# Patient Record
Sex: MALE | Race: WHITE | NOT HISPANIC OR LATINO | Employment: FULL TIME | URBAN - METROPOLITAN AREA
[De-identification: names, ages, dates, MRNs, and addresses within clinical notes are randomized per-mention and may not be internally consistent; named-entity substitution may affect disease eponyms.]

---

## 2018-04-19 ENCOUNTER — OFFICE VISIT (OUTPATIENT)
Dept: CARDIOLOGY | Facility: CLINIC | Age: 53
End: 2018-04-19

## 2018-04-19 VITALS
WEIGHT: 265 LBS | DIASTOLIC BLOOD PRESSURE: 74 MMHG | BODY MASS INDEX: 37.94 KG/M2 | SYSTOLIC BLOOD PRESSURE: 112 MMHG | HEART RATE: 82 BPM | HEIGHT: 70 IN

## 2018-04-19 DIAGNOSIS — I49.3 PVC'S (PREMATURE VENTRICULAR CONTRACTIONS): ICD-10-CM

## 2018-04-19 DIAGNOSIS — R00.2 HEART PALPITATIONS: Primary | ICD-10-CM

## 2018-04-19 PROBLEM — I49.1 PREMATURE ATRIAL COMPLEXES: Status: ACTIVE | Noted: 2018-04-19

## 2018-04-19 PROCEDURE — 99214 OFFICE O/P EST MOD 30 MIN: CPT | Performed by: INTERNAL MEDICINE

## 2018-04-19 PROCEDURE — 93000 ELECTROCARDIOGRAM COMPLETE: CPT | Performed by: INTERNAL MEDICINE

## 2018-04-19 RX ORDER — CHLORTHALIDONE 25 MG/1
TABLET ORAL DAILY
COMMUNITY
Start: 2013-10-30

## 2018-04-19 RX ORDER — INSULIN ASPART 100 [IU]/ML
INJECTION, SOLUTION INTRAVENOUS; SUBCUTANEOUS
COMMUNITY
Start: 2018-04-17

## 2018-04-19 RX ORDER — METOPROLOL SUCCINATE 100 MG/1
TABLET, EXTENDED RELEASE ORAL DAILY
COMMUNITY
Start: 2014-08-11 | End: 2022-10-17 | Stop reason: SDUPTHER

## 2018-04-19 RX ORDER — ESCITALOPRAM OXALATE 20 MG/1
20 TABLET ORAL DAILY
COMMUNITY
Start: 2014-04-21

## 2018-04-19 RX ORDER — MELOXICAM 15 MG/1
TABLET ORAL DAILY
COMMUNITY
Start: 2014-07-07

## 2018-04-19 RX ORDER — ROPINIROLE 0.5 MG/1
0.5 TABLET, FILM COATED ORAL DAILY
COMMUNITY
Start: 2018-04-17 | End: 2020-11-12 | Stop reason: ALTCHOICE

## 2018-04-19 RX ORDER — MONTELUKAST SODIUM 10 MG/1
10 TABLET ORAL NIGHTLY
COMMUNITY

## 2018-04-19 RX ORDER — SITAGLIPTIN AND METFORMIN HYDROCHLORIDE 1000; 50 MG/1; MG/1
TABLET, FILM COATED ORAL
Refills: 2 | COMMUNITY
Start: 2018-04-02 | End: 2018-11-01 | Stop reason: ALTCHOICE

## 2018-04-19 RX ORDER — ASPIRIN 81 MG/1
81 TABLET, CHEWABLE ORAL DAILY
COMMUNITY
End: 2019-11-07

## 2018-04-19 RX ORDER — FENOFIBRATE 160 MG/1
160 TABLET ORAL DAILY
COMMUNITY
Start: 2013-10-29 | End: 2019-07-16 | Stop reason: SDUPTHER

## 2018-04-19 RX ORDER — INSULIN DEGLUDEC 200 U/ML
INJECTION, SOLUTION SUBCUTANEOUS
Refills: 3 | COMMUNITY
Start: 2018-04-01 | End: 2021-11-24

## 2018-04-19 NOTE — PROGRESS NOTES
Ridge Dowd   1965  Date of Office Visit: 04/19/2018  Encounter Provider: Alek Do MD  Place of Service: [facility] CARDIOLOGY      CHIEF COMPLAINT:  Palpitations  PACs  PVCs    HISTORY OF PRESENT ILLNESS:Dr. Gibson,    I had the pleasure of seeing your patient in consultation today. As you well know, the patient is a very pleasant 52-year-old gentleman with a medical history of obesity, sleep apnea with CPAP noncompliance, diabetes mellitus, and hyperlipidemia who presents to me secondary to palpitations. He has previously been seen by Dr. Chawla in 2014. He had a Holter monitor placed at that time and previously had had a stress test with no evidence of ischemia or infarction. His Holter monitor showed occasional PVCs accounting for 1.3% of the overall beats. He was started back then on Toprol-XL therapy and is tolerating well.     He states that over the past couple of weeks he has noticed palpitations again. These are moderate in intensity and typically come on more towards the evening as his Toprol-XL is starting to come close to being due. He denies any chest pain. He has mild dyspnea on exertion. No orthopnea or PND. He denies any syncope or sustained tachycardia.        Review of Systems   Constitution: Negative for fever, weakness and malaise/fatigue.   HENT: Negative for nosebleeds and sore throat.    Eyes: Negative for blurred vision and double vision.   Cardiovascular: Negative for chest pain, claudication, palpitations and syncope.   Respiratory: Negative for cough, shortness of breath and snoring.    Endocrine: Negative for cold intolerance, heat intolerance and polydipsia.   Skin: Negative for itching, poor wound healing and rash.   Musculoskeletal: Negative for joint pain, joint swelling, muscle weakness and myalgias.   Gastrointestinal: Negative for abdominal pain, melena, nausea and vomiting.   Neurological: Negative for light-headedness,  "loss of balance, seizures and vertigo.   Psychiatric/Behavioral: Negative for altered mental status and depression.       Past Medical History:   Diagnosis Date   • Diabetes mellitus    • History of hyperlipidemia     with significant hypertriglyceridemia   • History of obesity    • History of obstructive sleep apnea     Not on CPAP   • Personal history of asthma    • Personal history of renal calculi    • PVC (premature ventricular contraction)     history of PVC       The following portions of the patient's history were reviewed and updated as appropriate: Social history , Family history and Surgical history     No current outpatient prescriptions on file prior to visit.     No current facility-administered medications on file prior to visit.        Allergies   Allergen Reactions   • Oxycodone Itching       Vitals:    04/19/18 1259   BP: 112/74   Pulse: 82   Weight: 120 kg (265 lb)   Height: 177.8 cm (70\")     Physical Exam   Constitutional: He is oriented to person, place, and time. He appears well-developed and well-nourished.   Obese     HENT:   Head: Normocephalic and atraumatic.   Eyes: Conjunctivae and EOM are normal. No scleral icterus.   Neck: Normal range of motion. Neck supple. Normal carotid pulses, no hepatojugular reflux and no JVD present. Carotid bruit is not present. No tracheal deviation present. No thyromegaly present.   Cardiovascular: Normal rate and regular rhythm.  Exam reveals no gallop and no friction rub.    No murmur heard.  Pulses:       Carotid pulses are 2+ on the right side, and 2+ on the left side.       Radial pulses are 2+ on the right side, and 2+ on the left side.        Femoral pulses are 2+ on the right side, and 2+ on the left side.       Dorsalis pedis pulses are 2+ on the right side, and 2+ on the left side.        Posterior tibial pulses are 2+ on the right side, and 2+ on the left side.   Pulmonary/Chest: Breath sounds normal. No respiratory distress. He has no decreased " breath sounds. He has no wheezes. He has no rhonchi. He has no rales. He exhibits no tenderness.   Abdominal: Soft. Bowel sounds are normal. He exhibits no distension. There is no tenderness. There is no rebound.   Musculoskeletal: He exhibits no edema or deformity.   Neurological: He is alert and oriented to person, place, and time. He has normal strength. No sensory deficit.   Skin: No rash noted. No erythema.   Psychiatric: He has a normal mood and affect. His behavior is normal.       No components found for: CBC  No results found for: CMP  No components found for: LIPID  No results found for: BMP      ECG 12 Lead  Date/Time: 4/19/2018 1:12 PM  Performed by: SEEMA BRAGG  Authorized by: SEEMA BRAGG   Comparison: compared with previous ECG from 9/16/2014  Similar to previous ECG  Rhythm: sinus rhythm  Ectopy: atrial premature contractions  Conduction: conduction normal  ST Segments: ST segments normal  Clinical impression: non-specific ECG             7/28/14  Exercise Cardiolite  9 minute exercise duration  No ischemia or infarction    DISCUSSION/SUMMARYA 52-year-old gentleman with obesity, CPAP noncompliance, diabetes mellitus, hyperlipidemia, and palpitations who presents to me for evaluation of palpitations. He has a previous history of premature ventricular complexes and is on high-dose Toprol-XL therapy. He is tolerating this well but does state that his palpitations have been more frequent as of late. These are low risk and he has not had any evidence of syncope or angina with them.    1. Palpitations: It is unclear to me whether he is just having more frequent PACs or whether he is having PVCs while on high-dose beta blockade therapy. I have encouraged weight loss and CPAP compliance. I think this would certainly address atrial ectopy and likely some degree of PVCs as well. If he has a significant PVC burden still on beta blockade, I would recommend an echocardiogram and an  antiarrhythmic. My preference would be to repeat a stress test and to put him on a 1C if that was the case.   2. Obesity: Weight loss and dietary modification.     I am also going to get an echo on him. I do not think he needs a stress test at this time. He has no angina.

## 2018-04-26 ENCOUNTER — HOSPITAL ENCOUNTER (OUTPATIENT)
Dept: CARDIOLOGY | Facility: HOSPITAL | Age: 53
Discharge: HOME | End: 2018-04-26
Attending: INTERNAL MEDICINE | Admitting: INTERNAL MEDICINE

## 2018-04-26 VITALS — HEIGHT: 70 IN | HEART RATE: 90 BPM | BODY MASS INDEX: 37.94 KG/M2 | WEIGHT: 265 LBS

## 2018-04-26 DIAGNOSIS — I49.3 PVC'S (PREMATURE VENTRICULAR CONTRACTIONS): ICD-10-CM

## 2018-04-26 DIAGNOSIS — R00.2 HEART PALPITATIONS: ICD-10-CM

## 2018-04-26 PROCEDURE — 25010000002 PERFLUTREN (DEFINITY) 8.476 MG IN SODIUM CHLORIDE 0.9 % 10 ML INJECTION: Performed by: INTERNAL MEDICINE

## 2018-04-26 PROCEDURE — 93306 TTE W/DOPPLER COMPLETE: CPT

## 2018-04-26 PROCEDURE — 93306 TTE W/DOPPLER COMPLETE: CPT | Performed by: INTERNAL MEDICINE

## 2018-04-26 RX ADMIN — PERFLUTREN 1.5 ML: 6.52 INJECTION, SUSPENSION INTRAVENOUS at 14:42

## 2018-04-27 LAB
ASCENDING AORTA: 3.2 CM
BH CV ECHO MEAS - ACS: 2.1 CM
BH CV ECHO MEAS - AO MAX PG (FULL): 0.97 MMHG
BH CV ECHO MEAS - AO MAX PG: 3.8 MMHG
BH CV ECHO MEAS - AO MEAN PG (FULL): 0.3 MMHG
BH CV ECHO MEAS - AO MEAN PG: 1.8 MMHG
BH CV ECHO MEAS - AO ROOT AREA (BSA CORRECTED): 1.3
BH CV ECHO MEAS - AO ROOT AREA: 7.1 CM^2
BH CV ECHO MEAS - AO ROOT DIAM: 3 CM
BH CV ECHO MEAS - AO V2 MAX: 97.7 CM/SEC
BH CV ECHO MEAS - AO V2 MEAN: 56.6 CM/SEC
BH CV ECHO MEAS - AO V2 VTI: 17.9 CM
BH CV ECHO MEAS - ASC AORTA: 3.2 CM
BH CV ECHO MEAS - AVA(I,A): 4.5 CM^2
BH CV ECHO MEAS - AVA(I,D): 4.5 CM^2
BH CV ECHO MEAS - AVA(V,A): 4.2 CM^2
BH CV ECHO MEAS - AVA(V,D): 4.2 CM^2
BH CV ECHO MEAS - BSA(HAYCOCK): 2.5 M^2
BH CV ECHO MEAS - BSA: 2.4 M^2
BH CV ECHO MEAS - BZI_BMI: 38 KILOGRAMS/M^2
BH CV ECHO MEAS - BZI_METRIC_HEIGHT: 177.8 CM
BH CV ECHO MEAS - BZI_METRIC_WEIGHT: 120.2 KG
BH CV ECHO MEAS - CONTRAST EF (2CH): 63.1 ML/M^2
BH CV ECHO MEAS - CONTRAST EF 4CH: 63.6 ML/M^2
BH CV ECHO MEAS - EDV(MOD-SP2): 84 ML
BH CV ECHO MEAS - EDV(MOD-SP4): 77 ML
BH CV ECHO MEAS - EDV(TEICH): 107.6 ML
BH CV ECHO MEAS - EF(CUBED): 78.4 %
BH CV ECHO MEAS - EF(MOD-BP): 64 %
BH CV ECHO MEAS - EF(MOD-SP2): 63.1 %
BH CV ECHO MEAS - EF(MOD-SP4): 63.6 %
BH CV ECHO MEAS - EF(TEICH): 70.5 %
BH CV ECHO MEAS - ESV(MOD-SP2): 31 ML
BH CV ECHO MEAS - ESV(MOD-SP4): 28 ML
BH CV ECHO MEAS - ESV(TEICH): 31.7 ML
BH CV ECHO MEAS - FS: 40 %
BH CV ECHO MEAS - IVS/LVPW: 1.1
BH CV ECHO MEAS - IVSD: 1.3 CM
BH CV ECHO MEAS - LAT PEAK E' VEL: 11 CM/SEC
BH CV ECHO MEAS - LV DIASTOLIC VOL/BSA (35-75): 32.7 ML/M^2
BH CV ECHO MEAS - LV MASS(C)D: 231.9 GRAMS
BH CV ECHO MEAS - LV MASS(C)DI: 98.6 GRAMS/M^2
BH CV ECHO MEAS - LV MAX PG: 2.8 MMHG
BH CV ECHO MEAS - LV MEAN PG: 1.5 MMHG
BH CV ECHO MEAS - LV SYSTOLIC VOL/BSA (12-30): 11.9 ML/M^2
BH CV ECHO MEAS - LV V1 MAX: 84.4 CM/SEC
BH CV ECHO MEAS - LV V1 MEAN: 53.6 CM/SEC
BH CV ECHO MEAS - LV V1 VTI: 16.4 CM
BH CV ECHO MEAS - LVIDD: 4.8 CM
BH CV ECHO MEAS - LVIDS: 2.9 CM
BH CV ECHO MEAS - LVLD AP2: 7.5 CM
BH CV ECHO MEAS - LVLD AP4: 8 CM
BH CV ECHO MEAS - LVLS AP2: 6.4 CM
BH CV ECHO MEAS - LVLS AP4: 6.5 CM
BH CV ECHO MEAS - LVOT AREA (M): 4.9 CM^2
BH CV ECHO MEAS - LVOT AREA: 4.9 CM^2
BH CV ECHO MEAS - LVOT DIAM: 2.5 CM
BH CV ECHO MEAS - LVPWD: 1.2 CM
BH CV ECHO MEAS - MED PEAK E' VEL: 8 CM/SEC
BH CV ECHO MEAS - MV A DUR: 0.11 SEC
BH CV ECHO MEAS - MV A MAX VEL: 66 CM/SEC
BH CV ECHO MEAS - MV DEC SLOPE: 380.7 CM/SEC^2
BH CV ECHO MEAS - MV DEC TIME: 0.2 SEC
BH CV ECHO MEAS - MV E MAX VEL: 75.7 CM/SEC
BH CV ECHO MEAS - MV E/A: 1.1
BH CV ECHO MEAS - MV MAX PG: 3.8 MMHG
BH CV ECHO MEAS - MV MEAN PG: 2 MMHG
BH CV ECHO MEAS - MV P1/2T MAX VEL: 76.6 CM/SEC
BH CV ECHO MEAS - MV P1/2T: 59 MSEC
BH CV ECHO MEAS - MV V2 MAX: 97.7 CM/SEC
BH CV ECHO MEAS - MV V2 MEAN: 66.1 CM/SEC
BH CV ECHO MEAS - MV V2 VTI: 18.1 CM
BH CV ECHO MEAS - MVA P1/2T LCG: 2.9 CM^2
BH CV ECHO MEAS - MVA(P1/2T): 3.7 CM^2
BH CV ECHO MEAS - MVA(VTI): 4.4 CM^2
BH CV ECHO MEAS - PA MAX PG (FULL): 2.7 MMHG
BH CV ECHO MEAS - PA MAX PG: 5.1 MMHG
BH CV ECHO MEAS - PA V2 MAX: 112.7 CM/SEC
BH CV ECHO MEAS - PULM A REVS DUR: 0.12 SEC
BH CV ECHO MEAS - PULM A REVS VEL: 33.3 CM/SEC
BH CV ECHO MEAS - PULM DIAS VEL: 49.5 CM/SEC
BH CV ECHO MEAS - PULM S/D: 1.4
BH CV ECHO MEAS - PULM SYS VEL: 67.1 CM/SEC
BH CV ECHO MEAS - PVA(V,A): 2.4 CM^2
BH CV ECHO MEAS - PVA(V,D): 2.4 CM^2
BH CV ECHO MEAS - QP/QS: 0.58
BH CV ECHO MEAS - RV MAX PG: 2.4 MMHG
BH CV ECHO MEAS - RV MEAN PG: 0.97 MMHG
BH CV ECHO MEAS - RV V1 MAX: 77.6 CM/SEC
BH CV ECHO MEAS - RV V1 MEAN: 42.9 CM/SEC
BH CV ECHO MEAS - RV V1 VTI: 13.3 CM
BH CV ECHO MEAS - RVOT AREA: 3.5 CM^2
BH CV ECHO MEAS - RVOT DIAM: 2.1 CM
BH CV ECHO MEAS - SI(AO): 54.3 ML/M^2
BH CV ECHO MEAS - SI(CUBED): 36.9 ML/M^2
BH CV ECHO MEAS - SI(LVOT): 34 ML/M^2
BH CV ECHO MEAS - SI(MOD-SP2): 22.5 ML/M^2
BH CV ECHO MEAS - SI(MOD-SP4): 20.8 ML/M^2
BH CV ECHO MEAS - SI(TEICH): 32.3 ML/M^2
BH CV ECHO MEAS - SUP REN AO DIAM: 1.6 CM
BH CV ECHO MEAS - SV(AO): 127.8 ML
BH CV ECHO MEAS - SV(CUBED): 86.8 ML
BH CV ECHO MEAS - SV(LVOT): 80 ML
BH CV ECHO MEAS - SV(MOD-SP2): 53 ML
BH CV ECHO MEAS - SV(MOD-SP4): 49 ML
BH CV ECHO MEAS - SV(RVOT): 46.7 ML
BH CV ECHO MEAS - SV(TEICH): 75.9 ML
BH CV ECHO MEAS - TAPSE (>1.6): 3.4 CM2
BH CV ECHO MEASUREMENTS AVERAGE E/E' RATIO: 7.97
BH CV XLRA - RV BASE: 2.9 CM
BH CV XLRA - TDI S': 16 CM/SEC
LEFT ATRIUM VOLUME INDEX: 11 ML/M2
MAXIMAL PREDICTED HEART RATE: 169 BPM
SINUS: 3.2 CM
STJ: 3.8 CM
STRESS TARGET HR: 144 BPM

## 2018-05-01 ENCOUNTER — TELEPHONE (OUTPATIENT)
Dept: CARDIOLOGY | Facility: CLINIC | Age: 53
End: 2018-05-01

## 2018-05-01 NOTE — TELEPHONE ENCOUNTER
Pt called and would like the results of his 4/2018 echo and holter. It is in [facility] and he can be reached at #[phone].   Thanks,  Maite

## 2018-05-03 RX ORDER — METOPROLOL SUCCINATE 25 MG/1
25 TABLET, EXTENDED RELEASE ORAL DAILY
Qty: 30 TABLET | Refills: 0 | Status: SHIPPED | OUTPATIENT
Start: 2018-05-03 | End: 2018-08-01

## 2018-05-31 ENCOUNTER — HOSPITAL ENCOUNTER (EMERGENCY)
Facility: HOSPITAL | Age: 53
Discharge: HOME | End: 2018-05-31
Attending: EMERGENCY MEDICINE | Admitting: EMERGENCY MEDICINE

## 2018-05-31 ENCOUNTER — APPOINTMENT (OUTPATIENT)
Dept: CT IMAGING | Facility: HOSPITAL | Age: 53
End: 2018-05-31

## 2018-05-31 ENCOUNTER — TELEPHONE (OUTPATIENT)
Dept: CARDIOLOGY | Facility: CLINIC | Age: 53
End: 2018-05-31

## 2018-05-31 VITALS
WEIGHT: 258 LBS | HEIGHT: 70 IN | TEMPERATURE: 97.7 F | OXYGEN SATURATION: 93 % | RESPIRATION RATE: 16 BRPM | DIASTOLIC BLOOD PRESSURE: 76 MMHG | SYSTOLIC BLOOD PRESSURE: 126 MMHG | HEART RATE: 86 BPM | BODY MASS INDEX: 36.94 KG/M2

## 2018-05-31 DIAGNOSIS — K29.00 ACUTE GASTRITIS WITHOUT HEMORRHAGE, UNSPECIFIED GASTRITIS TYPE: Primary | ICD-10-CM

## 2018-05-31 DIAGNOSIS — R10.10 UPPER ABDOMINAL PAIN: ICD-10-CM

## 2018-05-31 LAB
ALBUMIN SERPL-MCNC: 4.3 G/DL (ref 3.5–5.2)
ALBUMIN/GLOB SERPL: 1.2 G/DL
ALP SERPL-CCNC: 39 U/L (ref 39–117)
ALT SERPL W P-5'-P-CCNC: 43 U/L (ref 1–41)
ANION GAP SERPL CALCULATED.3IONS-SCNC: 10.5 MMOL/L
AST SERPL-CCNC: 26 U/L (ref 1–40)
BASOPHILS # BLD AUTO: 0.06 10*3/MM3 (ref 0–0.2)
BASOPHILS NFR BLD AUTO: 0.6 % (ref 0–1.5)
BILIRUB SERPL-MCNC: 0.3 MG/DL (ref 0.1–1.2)
BUN BLD-MCNC: 25 MG/DL (ref 6–20)
BUN/CREAT SERPL: 24.5 (ref 7–25)
CALCIUM SPEC-SCNC: 10.4 MG/DL (ref 8.6–10.5)
CHLORIDE SERPL-SCNC: 98 MMOL/L (ref 98–107)
CO2 SERPL-SCNC: 31.5 MMOL/L (ref 22–29)
CREAT BLD-MCNC: 1.02 MG/DL (ref 0.76–1.27)
DEPRECATED RDW RBC AUTO: 42.4 FL (ref 37–54)
EOSINOPHIL # BLD AUTO: 0.04 10*3/MM3 (ref 0–0.7)
EOSINOPHIL NFR BLD AUTO: 0.4 % (ref 0.3–6.2)
ERYTHROCYTE [DISTWIDTH] IN BLOOD BY AUTOMATED COUNT: 13.5 % (ref 11.5–14.5)
GFR SERPL CREATININE-BSD FRML MDRD: 77 ML/MIN/1.73
GLOBULIN UR ELPH-MCNC: 3.5 GM/DL
GLUCOSE BLD-MCNC: 86 MG/DL (ref 65–99)
GLUCOSE BLDC GLUCOMTR-MCNC: 96 MG/DL (ref 70–130)
HCT VFR BLD AUTO: 46.6 % (ref 40.4–52.2)
HGB BLD-MCNC: 15.6 G/DL (ref 13.7–17.6)
IMM GRANULOCYTES # BLD: 0.05 10*3/MM3 (ref 0–0.03)
IMM GRANULOCYTES NFR BLD: 0.5 % (ref 0–0.5)
LIPASE SERPL-CCNC: 52 U/L (ref 13–60)
LYMPHOCYTES # BLD AUTO: 2.2 10*3/MM3 (ref 0.9–4.8)
LYMPHOCYTES NFR BLD AUTO: 20.6 % (ref 19.6–45.3)
MCH RBC QN AUTO: 29.3 PG (ref 27–32.7)
MCHC RBC AUTO-ENTMCNC: 33.5 G/DL (ref 32.6–36.4)
MCV RBC AUTO: 87.4 FL (ref 79.8–96.2)
MONOCYTES # BLD AUTO: 1 10*3/MM3 (ref 0.2–1.2)
MONOCYTES NFR BLD AUTO: 9.4 % (ref 5–12)
NEUTROPHILS # BLD AUTO: 7.32 10*3/MM3 (ref 1.9–8.1)
NEUTROPHILS NFR BLD AUTO: 68.5 % (ref 42.7–76)
PLATELET # BLD AUTO: 248 10*3/MM3 (ref 140–500)
PMV BLD AUTO: 10.6 FL (ref 6–12)
POTASSIUM BLD-SCNC: 3.6 MMOL/L (ref 3.5–5.2)
PROT SERPL-MCNC: 7.8 G/DL (ref 6–8.5)
RBC # BLD AUTO: 5.33 10*6/MM3 (ref 4.6–6)
SODIUM BLD-SCNC: 140 MMOL/L (ref 136–145)
TROPONIN T SERPL-MCNC: <0.01 NG/ML (ref 0–0.03)
WBC NRBC COR # BLD: 10.67 10*3/MM3 (ref 4.5–10.7)

## 2018-05-31 PROCEDURE — 96361 HYDRATE IV INFUSION ADD-ON: CPT

## 2018-05-31 PROCEDURE — 93005 ELECTROCARDIOGRAM TRACING: CPT | Performed by: EMERGENCY MEDICINE

## 2018-05-31 PROCEDURE — 85025 COMPLETE CBC W/AUTO DIFF WBC: CPT | Performed by: EMERGENCY MEDICINE

## 2018-05-31 PROCEDURE — 25010000002 KETOROLAC TROMETHAMINE PER 15 MG: Performed by: EMERGENCY MEDICINE

## 2018-05-31 PROCEDURE — 93005 ELECTROCARDIOGRAM TRACING: CPT

## 2018-05-31 PROCEDURE — 84484 ASSAY OF TROPONIN QUANT: CPT | Performed by: EMERGENCY MEDICINE

## 2018-05-31 PROCEDURE — 96374 THER/PROPH/DIAG INJ IV PUSH: CPT

## 2018-05-31 PROCEDURE — 25010000002 IOPAMIDOL 61 % SOLUTION: Performed by: EMERGENCY MEDICINE

## 2018-05-31 PROCEDURE — 83690 ASSAY OF LIPASE: CPT | Performed by: EMERGENCY MEDICINE

## 2018-05-31 PROCEDURE — 99284 EMERGENCY DEPT VISIT MOD MDM: CPT

## 2018-05-31 PROCEDURE — 93010 ELECTROCARDIOGRAM REPORT: CPT | Performed by: INTERNAL MEDICINE

## 2018-05-31 PROCEDURE — 82962 GLUCOSE BLOOD TEST: CPT

## 2018-05-31 PROCEDURE — 74177 CT ABD & PELVIS W/CONTRAST: CPT

## 2018-05-31 PROCEDURE — 80053 COMPREHEN METABOLIC PANEL: CPT | Performed by: EMERGENCY MEDICINE

## 2018-05-31 RX ORDER — PANTOPRAZOLE SODIUM 40 MG/1
40 TABLET, DELAYED RELEASE ORAL DAILY
Qty: 21 TABLET | Refills: 0 | Status: SHIPPED | OUTPATIENT
Start: 2018-05-31 | End: 2018-11-01 | Stop reason: ALTCHOICE

## 2018-05-31 RX ORDER — KETOROLAC TROMETHAMINE 15 MG/ML
10 INJECTION, SOLUTION INTRAMUSCULAR; INTRAVENOUS ONCE
Status: COMPLETED | OUTPATIENT
Start: 2018-05-31 | End: 2018-05-31

## 2018-05-31 RX ORDER — SODIUM CHLORIDE 0.9 % (FLUSH) 0.9 %
10 SYRINGE (ML) INJECTION AS NEEDED
Status: DISCONTINUED | OUTPATIENT
Start: 2018-05-31 | End: 2018-06-01 | Stop reason: HOSPADM

## 2018-05-31 RX ORDER — ONDANSETRON 4 MG/1
4 TABLET, FILM COATED ORAL EVERY 6 HOURS PRN
Qty: 12 TABLET | Refills: 0 | Status: SHIPPED | OUTPATIENT
Start: 2018-05-31

## 2018-05-31 RX ADMIN — KETOROLAC TROMETHAMINE 10 MG: 15 INJECTION, SOLUTION INTRAMUSCULAR; INTRAVENOUS at 19:17

## 2018-05-31 RX ADMIN — IOPAMIDOL 85 ML: 612 INJECTION, SOLUTION INTRAVENOUS at 20:06

## 2018-05-31 RX ADMIN — SODIUM CHLORIDE 1000 ML: 9 INJECTION, SOLUTION INTRAVENOUS at 19:20

## 2018-06-01 NOTE — DISCHARGE INSTRUCTIONS
The CT scan today showed a small lesion on the left lobe of your liver.  This is probably a cyst.  You will need a follow-up CT scan in 6 months.  Your primary care doctor can order this.  Take medication as prescribed.  Eat a bland diet until symptoms improved.  Return to emergency department for worsening pain, vomiting, fever, or other concern.  Follow-up with your primary care doctor next week if symptoms are not improving.

## 2018-06-11 NOTE — TELEPHONE ENCOUNTER
Please find out how he is doing.  I want to know what his blood pressure and heart rate are and how his palpitations her behavior

## 2018-06-12 ENCOUNTER — TELEPHONE (OUTPATIENT)
Dept: CARDIOLOGY | Facility: CLINIC | Age: 53
End: 2018-06-12

## 2018-06-12 DIAGNOSIS — I49.3 PVC (PREMATURE VENTRICULAR CONTRACTION): Primary | ICD-10-CM

## 2018-06-12 NOTE — TELEPHONE ENCOUNTER
The patient has been seen by Dr. Do but is needing to make an appt. with Dr. Castro please call the patient to set that up.    Pt # [phone]    Thank you  Radha STRONG

## 2018-06-12 NOTE — TELEPHONE ENCOUNTER
I called the pt. He states he is doing better but he wants to go ahead an be seen by Dr. Castro.  He said his BP and heart rate are both fine. He did recently go to his PCP Dr.Adam Gibson and they did a EKG and he had a T wave abnormality and they sent him to the ED they also did an EKG that had the same findings and released him. I am going to have someone contact him to get him scheduled.    Thank you   Radha STRONG

## 2018-08-01 ENCOUNTER — OFFICE VISIT (OUTPATIENT)
Dept: CARDIOLOGY | Facility: CLINIC | Age: 53
End: 2018-08-01

## 2018-08-01 VITALS
DIASTOLIC BLOOD PRESSURE: 88 MMHG | HEART RATE: 85 BPM | SYSTOLIC BLOOD PRESSURE: 138 MMHG | HEIGHT: 70 IN | WEIGHT: 255 LBS | BODY MASS INDEX: 36.51 KG/M2

## 2018-08-01 DIAGNOSIS — I49.1 PREMATURE ATRIAL COMPLEXES: ICD-10-CM

## 2018-08-01 DIAGNOSIS — R00.2 HEART PALPITATIONS: Primary | ICD-10-CM

## 2018-08-01 DIAGNOSIS — G47.33 OSA ON CPAP: ICD-10-CM

## 2018-08-01 DIAGNOSIS — E66.9 OBESITY (BMI 30-39.9): ICD-10-CM

## 2018-08-01 DIAGNOSIS — Z99.89 OSA ON CPAP: ICD-10-CM

## 2018-08-01 PROCEDURE — 93000 ELECTROCARDIOGRAM COMPLETE: CPT | Performed by: NURSE PRACTITIONER

## 2018-08-01 PROCEDURE — 99214 OFFICE O/P EST MOD 30 MIN: CPT | Performed by: NURSE PRACTITIONER

## 2018-08-01 NOTE — PROGRESS NOTES
Date of Office Visit: 2018  Encounter Provider: CHRISTIANO Kearney  Place of Service: [facility] CARDIOLOGY  Patient Name: Ridge Dowd Jr.  :1965    Chief Complaint   Patient presents with   • Palpitations   :     HPI: Ridge Dowd Jr. is a 52 y.o. male who is a patient of Dr. Do's with a history of obesity, JENN/CPAP, DM, HLD and palpitations. He saw Dr. Chawla back in  for chest pain. He had a stress test which showed no ischemia and had a Holter which showed 1.3% PVC's and he was started on metoprolol.     He did well until around March when he noticed an increase his palpitations. He would have skips that made him cough and would sometimes take his breath away. He saw Dr. Do for the first time in April. He had a repeat Holter which showed SR with frequent APC's, 4%, 221 APC's/HR. He had no AF and no PVC's. He also had an echo which showed normal LV systolic function, EF 64% and no significant valvular abnormalities. His metoprolol was increased for him to try for about a month. He did not notice any real change and just went back to his regular dose.     He was not using his CPAP faithfully but has been doing that as well as loosing weight. In the last few weeks his palpitations seem to have been a little better. He is unaware of any particular triggers. He was under a lot of stress which is some better right now. He rarely drinks alcohol. 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     He does not have chest pain, shortness of breath, dizziness, edema or syncope.     He is a little concerned because his dad has what sounds like cardiomyopathy with low EF and has an ICD.        Past Medical History:   Diagnosis Date   • Diabetes mellitus (CMS/[facility])    • History of hyperlipidemia     with significant hypertriglyceridemia   • History of obesity    • History of obstructive sleep apnea      Not on CPAP   • Personal history of asthma    • Personal history of renal calculi    • PVC (premature ventricular contraction)     history of PVC       Past Surgical History:   Procedure Laterality Date   • APPENDECTOMY     • CHOLECYSTECTOMY     • OTHER SURGICAL HISTORY      lithotripsy   • PYLOROMYOTOMY     • TONSILLECTOMY         Social History     Social History   • Marital status:      Spouse name: N/A   • Number of children: N/A   • Years of education: N/A     Occupational History   • Not on file.     Social History Main Topics   • Smoking status: Never Smoker   • Smokeless tobacco: Not on file   • Alcohol use No   • Drug use: Unknown   • Sexual activity: Not on file     Other Topics Concern   • Not on file     Social History Narrative   • No narrative on file       Family History   Problem Relation Age of Onset   • Coronary artery disease Father         s/p 2 separate MI's.  Also has cardiomyopathy and has undergone ICD placement.   • Diabetes Father        Review of Systems   Constitution: Negative for chills, fever and malaise/fatigue.   Cardiovascular: Positive for palpitations. Negative for chest pain, dyspnea on exertion, leg swelling, near-syncope, orthopnea, paroxysmal nocturnal dyspnea and syncope.   Respiratory: Negative for cough and shortness of breath.    Musculoskeletal: Negative for joint pain, joint swelling and myalgias.   Gastrointestinal: Negative for abdominal pain, diarrhea, melena, nausea and vomiting.   Genitourinary: Negative for frequency and hematuria.   Neurological: Negative for light-headedness, numbness, paresthesias and seizures.   Allergic/Immunologic: Negative.    All other systems reviewed and are negative.      Allergies   Allergen Reactions   • Hydrocodone-Acetaminophen Itching   • Oxycodone Itching   • Oxycodone-Acetaminophen Itching         Current Outpatient Prescriptions:   •  aspirin 81 MG chewable tablet, Chew 81 mg Daily., Disp: , Rfl:   •  chlorthalidone  "(HYGROTON) 25 MG tablet, Take  by mouth Daily., Disp: , Rfl:   •  escitalopram (LEXAPRO) 10 MG tablet, Take  by mouth Daily., Disp: , Rfl:   •  fenofibrate 160 MG tablet, Take 160 mg by mouth Daily., Disp: , Rfl:   •  JANUMET  MG per tablet, TK 1 T PO BID WITH MEALS, Disp: , Rfl: 2  •  Liraglutide (VICTOZA SC), Inject  under the skin., Disp: , Rfl:   •  meloxicam (MOBIC) 15 MG tablet, Take  by mouth Daily., Disp: , Rfl:   •  metFORMIN (GLUCOPHAGE) 1000 MG tablet, Take 1,000 mg by mouth 2 (Two) Times a Day With Meals., Disp: , Rfl:   •  MethylPREDNISolone (MEDROL, JULIETA, PO), Take  by mouth., Disp: , Rfl:   •  metoprolol succinate XL (TOPROL XL) 100 MG 24 hr tablet, Take  by mouth Daily., Disp: , Rfl:   •  montelukast (SINGULAIR) 10 MG tablet, Take 10 mg by mouth Every Night., Disp: , Rfl:   •  Multiple Vitamins-Minerals (MULTIVITAMIN PO), Take  by mouth., Disp: , Rfl:   •  NOVOLOG FLEXPEN 100 UNIT/ML solution pen-injector sc pen, , Disp: , Rfl:   •  ondansetron (ZOFRAN) 4 MG tablet, Take 1 tablet by mouth Every 6 (Six) Hours As Needed for Nausea or Vomiting., Disp: 12 tablet, Rfl: 0  •  pantoprazole (PROTONIX) 40 MG EC tablet, Take 1 tablet by mouth Daily., Disp: 21 tablet, Rfl: 0  •  rOPINIRole (REQUIP) 0.25 MG tablet, Take 0.25 mg by mouth Daily., Disp: , Rfl:   •  TRESIBA FLEXTOUCH 200 UNIT/ML solution pen-injector, INJECT 110 UNITS UNDER THE SKIN ONCE DAILY, Disp: , Rfl: 3      Objective:     Vitals:    08/01/18 1015   BP: 138/88   Pulse: 85   Weight: 116 kg (255 lb)   Height: 177.8 cm (70\")     Body mass index is 36.59 kg/m².    PHYSICAL EXAM:    Vitals Reviewed.   General Appearance: No acute distress, well developed and well nourished.   Eyes: Conjunctiva and lids: No erythema, swelling, or discharge. Sclera non-icteric.   HENT: Atraumatic, normocephalic. External eyes, ears, and nose normal.   Respiratory: No signs of respiratory distress. Respiration rhythm and depth normal.   Clear to auscultation. No " rales, crackles, rhonchi, or wheezing auscultated.   Cardiovascular:  Jugular Venous Pressure: Normal  Heart Rate and Rhythm: Normal, Heart Sounds: Normal S1 and S2. No S3 or S4 noted.  Murmurs: No murmurs noted. No rubs, thrills, or gallops.   Arterial Pulses:  Posterior tibialis and dorsalis pedis pulses normal.   Lower Extremities: No edema noted.  Gastrointestinal:  Abdomen soft, non-distended, non-tender.   Musculoskeletal: Normal movement of extremities  Skin and Nails: General appearance normal. No pallor, cyanosis, diaphoresis. Skin temperature normal. No clubbing of fingernails.   Psychiatric: Patient alert and oriented to person, place, and time. Speech and behavior appropriate. Normal mood and affect.       ECG 12 Lead  Date/Time: 8/1/2018 10:47 AM  Performed by: SANTIAGO HERMAN  Authorized by: SANTIAGO HERMAN   Comparison: compared with previous ECG from 5/31/2018  Similar to previous ECG  Rhythm: sinus rhythm  BPM: 85  Clinical impression: non-specific ECG  Comments: Non specific T wave abnormality              Assessment:       Diagnosis Plan   1. Heart palpitations  ECG 12 Lead   2. Premature atrial complexes  ECG 12 Lead   3. Obesity (BMI 30-39.9)     4. JENN on CPAP            Plan:       1.-2. Palpitations and APC's. Dr. Castro and I saw Mr. Dowd. He has normal recent echo and normal stress in 2014. Reassured him that his APC's are benign. Discussed symptoms and the importance of risk factor modification. He is going to continue to work on risk factor modification and suspect that his ectopy will improve.     3. For the problem of overweight/obesity, we discussed the importance of lifestyle measures and strategies for weight loss, such as improved nutrition, regular exercise and sleep hygiene.      4. JENN, compliant with CPAP.    Has follow up with Dr. Do in November.    As always, it has been a pleasure to participate in your patient's care.      Sincerely,         CHRISTIANO Wade

## 2018-11-01 ENCOUNTER — OFFICE VISIT (OUTPATIENT)
Dept: CARDIOLOGY | Facility: CLINIC | Age: 53
End: 2018-11-01

## 2018-11-01 VITALS
BODY MASS INDEX: 36.65 KG/M2 | SYSTOLIC BLOOD PRESSURE: 118 MMHG | HEIGHT: 70 IN | DIASTOLIC BLOOD PRESSURE: 60 MMHG | WEIGHT: 256 LBS | HEART RATE: 85 BPM

## 2018-11-01 DIAGNOSIS — I49.3 PVC'S (PREMATURE VENTRICULAR CONTRACTIONS): Primary | ICD-10-CM

## 2018-11-01 PROCEDURE — 99214 OFFICE O/P EST MOD 30 MIN: CPT | Performed by: INTERNAL MEDICINE

## 2018-11-01 PROCEDURE — 93000 ELECTROCARDIOGRAM COMPLETE: CPT | Performed by: INTERNAL MEDICINE

## 2018-11-01 RX ORDER — LIRAGLUTIDE 6 MG/ML
1.8 INJECTION SUBCUTANEOUS DAILY
Refills: 1 | COMMUNITY
Start: 2018-10-25 | End: 2019-11-07

## 2018-11-01 NOTE — PROGRESS NOTES
Ridge Dowd   1965  Date of Office Visit: 04/19/2018  Encounter Provider: Alek Do MD  Place of Service: [facility] CARDIOLOGY      CHIEF COMPLAINT:  Palpitations  PACs  PVCs    HISTORY OF PRESENT ILLNESS:Dr. Gibson,    I had the pleasure of seeing your patient in consultation today. As you well know, the patient is a very pleasant 52-year-old gentleman with a medical history of obesity, sleep apnea with CPAP noncompliance, diabetes mellitus, and hyperlipidemia who presents to me secondary to palpitations. He has previously been seen by Dr. Chawla in 2014. He had a Holter monitor placed at that time and previously had had a stress test with no evidence of ischemia or infarction. His Holter monitor showed occasional PVCs accounting for 1.3% of the overall beats. He was started back then on Toprol-XL therapy and is tolerating well.     He states that over the past couple of weeks he has noticed palpitations again. These are moderate in intensity and typically come on more towards the evening as his Toprol-XL is starting to come close to being due. He denies any chest pain. He has mild dyspnea on exertion. No orthopnea or PND. He denies any syncope or sustained tachycardia.        Review of Systems   Constitution: Negative for fever, weakness and malaise/fatigue.   HENT: Negative for nosebleeds and sore throat.    Eyes: Negative for blurred vision and double vision.   Cardiovascular: Negative for chest pain, claudication, palpitations and syncope.   Respiratory: Negative for cough, shortness of breath and snoring.    Endocrine: Negative for cold intolerance, heat intolerance and polydipsia.   Skin: Negative for itching, poor wound healing and rash.   Musculoskeletal: Negative for joint pain, joint swelling, muscle weakness and myalgias.   Gastrointestinal: Negative for abdominal pain, melena, nausea and vomiting.   Neurological: Negative for light-headedness,  loss of balance, seizures and vertigo.   Psychiatric/Behavioral: Negative for altered mental status and depression.       Past Medical History:   Diagnosis Date   • Diabetes mellitus (CMS/HCC)    • History of hyperlipidemia     with significant hypertriglyceridemia   • History of obesity    • History of obstructive sleep apnea     Not on CPAP   • Personal history of asthma    • Personal history of renal calculi    • PVC (premature ventricular contraction)     history of PVC       The following portions of the patient's history were reviewed and updated as appropriate: Social history , Family history and Surgical history     Current Outpatient Prescriptions on File Prior to Visit   Medication Sig Dispense Refill   • aspirin 81 MG chewable tablet Chew 81 mg Daily.     • chlorthalidone (HYGROTON) 25 MG tablet Take  by mouth Daily.     • escitalopram (LEXAPRO) 10 MG tablet Take  by mouth Daily.     • fenofibrate 160 MG tablet Take 160 mg by mouth Daily.     • meloxicam (MOBIC) 15 MG tablet Take  by mouth Daily.     • metFORMIN (GLUCOPHAGE) 1000 MG tablet Take 1,000 mg by mouth 2 (Two) Times a Day With Meals.     • metoprolol succinate XL (TOPROL XL) 100 MG 24 hr tablet Take  by mouth Daily.     • montelukast (SINGULAIR) 10 MG tablet Take 10 mg by mouth Every Night.     • Multiple Vitamins-Minerals (MULTIVITAMIN PO) Take  by mouth.     • NOVOLOG FLEXPEN 100 UNIT/ML solution pen-injector sc pen      • ondansetron (ZOFRAN) 4 MG tablet Take 1 tablet by mouth Every 6 (Six) Hours As Needed for Nausea or Vomiting. 12 tablet 0   • rOPINIRole (REQUIP) 0.25 MG tablet Take 0.25 mg by mouth Daily.     • TRESIBA FLEXTOUCH 200 UNIT/ML solution pen-injector INJECT 110 UNITS UNDER THE SKIN ONCE DAILY  3   • [DISCONTINUED] JANUMET  MG per tablet TK 1 T PO BID WITH MEALS  2   • [DISCONTINUED] Liraglutide (VICTOZA SC) Inject  under the skin.     • [DISCONTINUED] MethylPREDNISolone (MEDROL, JULIETA, PO) Take  by mouth.     •  "[DISCONTINUED] pantoprazole (PROTONIX) 40 MG EC tablet Take 1 tablet by mouth Daily. 21 tablet 0     No current facility-administered medications on file prior to visit.        Allergies   Allergen Reactions   • Hydrocodone-Acetaminophen Itching   • Oxycodone Itching   • Oxycodone-Acetaminophen Itching       Vitals:    11/01/18 1346   BP: 118/60   Pulse: 85   Weight: 116 kg (256 lb)   Height: 177.8 cm (70\")     Physical Exam   Constitutional: He is oriented to person, place, and time. He appears well-developed and well-nourished.   Obese     HENT:   Head: Normocephalic and atraumatic.   Eyes: Conjunctivae and EOM are normal. No scleral icterus.   Neck: Normal range of motion. Neck supple. Normal carotid pulses, no hepatojugular reflux and no JVD present. Carotid bruit is not present. No tracheal deviation present. No thyromegaly present.   Cardiovascular: Normal rate and regular rhythm.  Exam reveals no gallop and no friction rub.    No murmur heard.  Pulses:       Carotid pulses are 2+ on the right side, and 2+ on the left side.       Radial pulses are 2+ on the right side, and 2+ on the left side.        Femoral pulses are 2+ on the right side, and 2+ on the left side.       Dorsalis pedis pulses are 2+ on the right side, and 2+ on the left side.        Posterior tibial pulses are 2+ on the right side, and 2+ on the left side.   Pulmonary/Chest: Breath sounds normal. No respiratory distress. He has no decreased breath sounds. He has no wheezes. He has no rhonchi. He has no rales. He exhibits no tenderness.   Abdominal: Soft. Bowel sounds are normal. He exhibits no distension. There is no tenderness. There is no rebound.   Musculoskeletal: He exhibits no edema or deformity.   Neurological: He is alert and oriented to person, place, and time. He has normal strength. No sensory deficit.   Skin: No rash noted. No erythema.   Psychiatric: He has a normal mood and affect. His behavior is normal.       No components " found for: CBC  No results found for: CMP  No components found for: LIPID  No results found for: BMP      ECG 12 Lead  Date/Time: 11/1/2018 2:44 PM  Performed by: SEEMA BRAGG  Authorized by: SEEMA BRAGG   Comparison: compared with previous ECG from 8/1/2018  Rhythm: sinus rhythm  Rate: normal  QRS axis: normal  Clinical impression: non-specific ECG  Comments: Nonspecific T waves diffuse leads               4/26/18  · Left ventricular systolic function is normal. Calculated EF = 64%. Estimated EF was in agreement with the calculated EF. Normal left ventricular cavity size noted. All left ventricular wall segments contract normally. Left ventricular wall thickness is consistent with mild concentric hypertrophy. Left ventricular diastolic function is normal.    DISCUSSION/SUMMARY   52-year-old gentleman with obesity, CPAP noncompliance, diabetes mellitus, hyperlipidemia, and palpitations who presents to me for evaluation of palpitations. He has a previous history of premature ventricular complexes and is on high-dose Toprol-XL therapy.  He is tolerating this well and has been taking a total of 150 mg of Toprol daily.    1. Palpitations: PVCs   -This seems to be well controlled at this time on his current dose of Toprol.  I'll call him and 50 mg tablets for him to take in the morning for a total of 150 mg a day.  2. Obesity: Weight loss and dietary modification.  He has not lost any weight from our last visit.

## 2018-11-06 RX ORDER — METOPROLOL SUCCINATE 50 MG/1
50 TABLET, EXTENDED RELEASE ORAL DAILY
Qty: 30 TABLET | Refills: 5 | Status: SHIPPED | OUTPATIENT
Start: 2018-11-06 | End: 2019-05-01 | Stop reason: SDUPTHER

## 2018-11-08 ENCOUNTER — OFFICE (AMBULATORY)
Dept: URBAN - METROPOLITAN AREA CLINIC 64 | Facility: CLINIC | Age: 53
End: 2018-11-08
Payer: COMMERCIAL

## 2018-11-08 VITALS
WEIGHT: 256 LBS | SYSTOLIC BLOOD PRESSURE: 107 MMHG | HEART RATE: 93 BPM | HEIGHT: 70 IN | DIASTOLIC BLOOD PRESSURE: 65 MMHG

## 2018-11-08 DIAGNOSIS — K62.5 HEMORRHAGE OF ANUS AND RECTUM: ICD-10-CM

## 2018-11-08 DIAGNOSIS — R13.10 DYSPHAGIA, UNSPECIFIED: ICD-10-CM

## 2018-11-08 PROCEDURE — 99202 OFFICE O/P NEW SF 15 MIN: CPT | Performed by: NURSE PRACTITIONER

## 2018-11-29 ENCOUNTER — ON CAMPUS - OUTPATIENT (AMBULATORY)
Dept: URBAN - METROPOLITAN AREA HOSPITAL 85 | Facility: HOSPITAL | Age: 53
End: 2018-11-29
Payer: COMMERCIAL

## 2018-11-29 ENCOUNTER — HOSPITAL ENCOUNTER (OUTPATIENT)
Dept: GASTROENTEROLOGY | Facility: HOSPITAL | Age: 53
Setting detail: HOSPITAL OUTPATIENT SURGERY
Discharge: HOME | End: 2018-11-29
Attending: INTERNAL MEDICINE | Admitting: INTERNAL MEDICINE

## 2018-11-29 DIAGNOSIS — R13.10 DYSPHAGIA, UNSPECIFIED: ICD-10-CM

## 2018-11-29 DIAGNOSIS — C18.9 MALIGNANT NEOPLASM OF COLON, UNSPECIFIED: ICD-10-CM

## 2018-11-29 DIAGNOSIS — K62.5 HEMORRHAGE OF ANUS AND RECTUM: ICD-10-CM

## 2018-11-29 DIAGNOSIS — K22.2 ESOPHAGEAL OBSTRUCTION: ICD-10-CM

## 2018-11-29 DIAGNOSIS — K20.8 OTHER ESOPHAGITIS: ICD-10-CM

## 2018-11-29 LAB
GLUCOSE BLD-MCNC: 148 MG/DL (ref 70–105)
GLUCOSE BLD-MCNC: 183 MG/DL (ref 70–105)

## 2018-11-29 PROCEDURE — 45380 COLONOSCOPY AND BIOPSY: CPT | Performed by: INTERNAL MEDICINE

## 2018-11-29 PROCEDURE — 43239 EGD BIOPSY SINGLE/MULTIPLE: CPT | Mod: 59 | Performed by: INTERNAL MEDICINE

## 2018-11-29 PROCEDURE — 43249 ESOPH EGD DILATION <30 MM: CPT | Performed by: INTERNAL MEDICINE

## 2018-11-29 PROCEDURE — 45381 COLONOSCOPY SUBMUCOUS NJX: CPT | Mod: 59 | Performed by: INTERNAL MEDICINE

## 2019-05-01 RX ORDER — METOPROLOL SUCCINATE 50 MG/1
TABLET, EXTENDED RELEASE ORAL
Qty: 30 TABLET | Refills: 5 | Status: SHIPPED | OUTPATIENT
Start: 2019-05-01 | End: 2019-11-13 | Stop reason: SDUPTHER

## 2019-07-16 RX ORDER — FENOFIBRATE 160 MG/1
160 TABLET ORAL DAILY
Qty: 90 TABLET | Refills: 1 | Status: SHIPPED | OUTPATIENT
Start: 2019-07-16 | End: 2019-12-26

## 2019-11-07 ENCOUNTER — OFFICE VISIT (OUTPATIENT)
Dept: CARDIOLOGY | Facility: CLINIC | Age: 54
End: 2019-11-07

## 2019-11-07 VITALS
HEART RATE: 76 BPM | DIASTOLIC BLOOD PRESSURE: 60 MMHG | WEIGHT: 256 LBS | HEIGHT: 70 IN | SYSTOLIC BLOOD PRESSURE: 100 MMHG | BODY MASS INDEX: 36.65 KG/M2

## 2019-11-07 DIAGNOSIS — R42 DIZZINESS: ICD-10-CM

## 2019-11-07 DIAGNOSIS — R00.2 PALPITATIONS: Primary | ICD-10-CM

## 2019-11-07 PROCEDURE — 93000 ELECTROCARDIOGRAM COMPLETE: CPT | Performed by: PHYSICIAN ASSISTANT

## 2019-11-07 PROCEDURE — 99213 OFFICE O/P EST LOW 20 MIN: CPT | Performed by: PHYSICIAN ASSISTANT

## 2019-11-07 RX ORDER — POTASSIUM CHLORIDE 750 MG/1
10 TABLET, FILM COATED, EXTENDED RELEASE ORAL DAILY
COMMUNITY
Start: 2019-11-05 | End: 2021-11-24

## 2019-11-07 RX ORDER — FEXOFENADINE HCL AND PSEUDOEPHEDRINE HCI 180; 240 MG/1; MG/1
1 TABLET, EXTENDED RELEASE ORAL DAILY
COMMUNITY
Start: 2019-08-28

## 2019-11-07 RX ORDER — OMEPRAZOLE 40 MG/1
40 CAPSULE, DELAYED RELEASE ORAL DAILY
COMMUNITY

## 2019-11-07 RX ORDER — TRAZODONE HYDROCHLORIDE 150 MG/1
150 TABLET ORAL DAILY
COMMUNITY
Start: 2019-09-19

## 2019-11-07 NOTE — PROGRESS NOTES
Date of Office Visit: 2019  Encounter Provider: JAQUELINE Queen  Place of Service: [facility] CARDIOLOGY  Patient Name: Ridge Dowd Jr.  :1965    Chief Complaint   Patient presents with   • PVC   :     HPI: Ridge Dowd Jr. is a 53 y.o. male, new to me, who presents today for follow-up.  Old records have been obtained and reviewed by me.  He is a patient who has been seen by Dr. Chawla, Dr. Do, and to Solange Rahman.  In  he had a normal stress test and a Holter monitor that showed occasional PVCs.  For this he was started on Toprol-XL therapy.  He then had increased PVCs in 2018 and had another Holter monitor placed as well as an echocardiogram.  His echocardiogram was normal, and his Holter showed frequent PACs (4%) without atrial fibrillation or PVCs.  We have felt that his PACs have been secondary to stress and noncompliance with his CPAP machine.  He was last in our office to see Dr. Do on 2018.  At that visit he was doing fairly well but noticed that his PACs would increase in intensity in the evening close to when it was time to take his Toprol-XL.  Dr. Do gave him an extra 50 mg of Toprol-XL to use so that he could bridge the gap between doses and avoid having any PACs.  He is here today for yearly visit.  It looks like in January of this year he was diagnosed with colon cancer and underwent an open transverse to left colectomy with primary anastomosis.  This was found to be adenocarcinoma.  It looks like he also underwent chemotherapy.   From a cardiac standpoint he is been doing well.  He denies any chest pain, shortness of breath, palpitations, edema, dizziness, or syncope.  He takes 50 mg of Toprol in the morning and 100 mg in the evening and this really keeps his palpitations at bay.  He had a really really terrible 2019.  There were several deaths in his family and both he and his mom went through chemotherapy at the same  time.  Fortunately he is in remission and he no longer has to do chemotherapy.  He has some residual numbness of his fingertips and he has been having episodes of dizziness.  He feels like the room is somewhat spinning and off-balance.  He denies any presyncope or syncope.    Past Medical History:   Diagnosis Date   • Cancer (CMS/HCC)     colon   • Diabetes mellitus (CMS/HCC)    • History of hyperlipidemia     with significant hypertriglyceridemia   • History of obesity    • History of obstructive sleep apnea     Not on CPAP   • Personal history of asthma    • Personal history of renal calculi    • PVC (premature ventricular contraction)     history of PVC       Past Surgical History:   Procedure Laterality Date   • APPENDECTOMY     • CHOLECYSTECTOMY     • COLON RESECTION  01/2019   • OTHER SURGICAL HISTORY      lithotripsy   • PYLOROMYOTOMY     • TONSILLECTOMY         Social History     Socioeconomic History   • Marital status:      Spouse name: Not on file   • Number of children: Not on file   • Years of education: Not on file   • Highest education level: Not on file   Tobacco Use   • Smoking status: Never Smoker   • Smokeless tobacco: Never Used   Substance and Sexual Activity   • Alcohol use: No     Comment: Daily caffeine use   • Drug use: No       Family History   Problem Relation Age of Onset   • Coronary artery disease Father         s/p 2 separate MI's.  Also has cardiomyopathy and has undergone ICD placement.   • Diabetes Father        Review of Systems   Constitution: Negative for chills, fever and malaise/fatigue.   Cardiovascular: Negative for chest pain, dyspnea on exertion, leg swelling, near-syncope, orthopnea, palpitations, paroxysmal nocturnal dyspnea and syncope.   Respiratory: Negative for cough and shortness of breath.    Musculoskeletal: Negative for joint pain, joint swelling and myalgias.   Gastrointestinal: Negative for abdominal pain, diarrhea, melena, nausea and vomiting.    Genitourinary: Negative for frequency and hematuria.   Neurological: Positive for dizziness. Negative for light-headedness, numbness, paresthesias and seizures.   Allergic/Immunologic: Negative.    All other systems reviewed and are negative.      Allergies   Allergen Reactions   • Hydrocodone-Acetaminophen Itching   • Oxycodone Itching   • Oxycodone-Acetaminophen Itching   • Chlorhexidine Itching     Itching when chg used to clean skin prior to port access and when chg impregnated dressings used over port site.         Current Outpatient Medications:   •  chlorthalidone (HYGROTON) 25 MG tablet, Take  by mouth Daily., Disp: , Rfl:   •  escitalopram (LEXAPRO) 20 MG tablet, Take 20 mg by mouth Daily., Disp: , Rfl:   •  fenofibrate 160 MG tablet, Take 1 tablet by mouth Daily., Disp: 90 tablet, Rfl: 1  •  fexofenadine-pseudoephedrine (ALLEGRA-D 24) 180-240 MG per 24 hr tablet, Take 1 tablet by mouth Daily., Disp: , Rfl:   •  meloxicam (MOBIC) 15 MG tablet, Take  by mouth Daily., Disp: , Rfl:   •  metFORMIN (GLUCOPHAGE) 1000 MG tablet, Take 1,000 mg by mouth 2 (Two) Times a Day With Meals., Disp: , Rfl:   •  metoprolol succinate XL (TOPROL XL) 100 MG 24 hr tablet, Take  by mouth Daily., Disp: , Rfl:   •  metoprolol succinate XL (TOPROL-XL) 50 MG 24 hr tablet, TAKE 1 TABLET BY MOUTH DAILY IN ADDITION  MG TABLET FOR A TOTAL  MG DAILY, Disp: 30 tablet, Rfl: 5  •  montelukast (SINGULAIR) 10 MG tablet, Take 10 mg by mouth Every Night., Disp: , Rfl:   •  Multiple Vitamins-Minerals (MULTIVITAMIN PO), Take  by mouth., Disp: , Rfl:   •  NOVOLOG FLEXPEN 100 UNIT/ML solution pen-injector sc pen, , Disp: , Rfl:   •  omeprazole (priLOSEC) 40 MG capsule, Take 40 mg by mouth Daily., Disp: , Rfl:   •  ondansetron (ZOFRAN) 4 MG tablet, Take 1 tablet by mouth Every 6 (Six) Hours As Needed for Nausea or Vomiting., Disp: 12 tablet, Rfl: 0  •  potassium chloride (K-DUR) 10 MEQ CR tablet, Take 10 mEq by mouth Daily., Disp: ,  "Rfl:   •  rOPINIRole (REQUIP) 0.5 MG tablet, Take 0.5 mg by mouth Daily., Disp: , Rfl:   •  Semaglutide, 1 MG/DOSE, (OZEMPIC, 1 MG/DOSE,) 2 MG/1.5ML solution pen-injector, 1 (One) Time Per Week., Disp: , Rfl:   •  traZODone (DESYREL) 150 MG tablet, Take 150 mg by mouth Daily., Disp: , Rfl:   •  TRESIBA FLEXTOUCH 200 UNIT/ML solution pen-injector, INJECT 110 UNITS UNDER THE SKIN ONCE DAILY, Disp: , Rfl: 3      Objective:     Vitals:    11/07/19 1402   BP: 100/60   Pulse: 76   Weight: 116 kg (256 lb)   Height: 177.8 cm (70\")     Body mass index is 36.73 kg/m².    PHYSICAL EXAM:    Physical Exam   Constitutional: He is oriented to person, place, and time. He appears well-developed and well-nourished. No distress.   HENT:   Head: Normocephalic and atraumatic.   Eyes: Pupils are equal, round, and reactive to light.   Neck: No JVD present. No thyromegaly present.   Cardiovascular: Normal rate, regular rhythm, normal heart sounds and intact distal pulses.   No murmur heard.  Pulmonary/Chest: Effort normal and breath sounds normal. No respiratory distress.   Abdominal: Soft. Bowel sounds are normal. He exhibits no distension. There is no splenomegaly or hepatomegaly. There is no tenderness.   Musculoskeletal: Normal range of motion. He exhibits no edema.   Neurological: He is alert and oriented to person, place, and time.   Skin: Skin is warm and dry. He is not diaphoretic. No erythema.   Psychiatric: He has a normal mood and affect. His behavior is normal. Judgment normal.         ECG 12 Lead  Date/Time: 11/7/2019 2:10 PM  Performed by: Farzana Prescott PA  Authorized by: Farzana Prescott PA   Comparison: compared with previous ECG from 11/1/2018  Similar to previous ECG  Rhythm: sinus rhythm  BPM: 76    Clinical impression: normal ECG  Comments: Indication: Palpitations              Assessment:       Diagnosis Plan   1. Palpitations  ECG 12 Lead   2. Dizziness       Orders Placed This Encounter   Procedures   • ECG 12 " Lead     This order was created via procedure documentation          Plan:       1.  Palpitations.  These have been attributed to PACs.  He has done well on beta-blocker therapy.  He has had a normal echocardiogram recently and in the remote past a normal stress test.  I am going to keep him on his Toprol-XL at its current dose.    2.  Dizziness.  I think this is neurological.  It seems to be somewhat related to the posture of his head.  Is not orthostatic and he does check his blood pressure when he is feeling dizzy and it is within normal limits.  He does not have any palpitations with his dizzy episodes and sometimes episodes can last for several hours so I do not think that it is any kind of dysrhythmia.  In light of his chemotherapy and other neurological symptoms that he has developed as a result, I think this is the most likely explanation.    Overall he stable and I am not going to make any changes.  He will see Dr. Do in 1 year or sooner if needed.    As always, it has been a pleasure to participate in your patient's care.      Sincerely,         Farzana Prescott PA-C

## 2019-11-13 RX ORDER — METOPROLOL SUCCINATE 50 MG/1
TABLET, EXTENDED RELEASE ORAL
Qty: 30 TABLET | Refills: 11 | Status: SHIPPED | OUTPATIENT
Start: 2019-11-13 | End: 2020-09-08

## 2019-12-26 RX ORDER — FENOFIBRATE 160 MG/1
160 TABLET ORAL DAILY
Qty: 90 TABLET | Refills: 1 | Status: SHIPPED | OUTPATIENT
Start: 2019-12-26 | End: 2020-07-02

## 2020-02-28 ENCOUNTER — OFFICE (AMBULATORY)
Dept: URBAN - METROPOLITAN AREA CLINIC 64 | Facility: CLINIC | Age: 55
End: 2020-02-28

## 2020-02-28 VITALS
WEIGHT: 235 LBS | SYSTOLIC BLOOD PRESSURE: 102 MMHG | HEIGHT: 70 IN | DIASTOLIC BLOOD PRESSURE: 63 MMHG | HEART RATE: 87 BPM

## 2020-02-28 DIAGNOSIS — R11.0 NAUSEA: ICD-10-CM

## 2020-02-28 DIAGNOSIS — R14.0 ABDOMINAL DISTENSION (GASEOUS): ICD-10-CM

## 2020-02-28 DIAGNOSIS — R19.4 CHANGE IN BOWEL HABIT: ICD-10-CM

## 2020-02-28 PROCEDURE — 99213 OFFICE O/P EST LOW 20 MIN: CPT | Performed by: NURSE PRACTITIONER

## 2020-02-28 RX ORDER — METRONIDAZOLE 500 MG/1
1500 TABLET, FILM COATED ORAL
Qty: 42 | Refills: 0 | Status: COMPLETED
Start: 2020-02-28 | End: 2020-05-26

## 2020-05-06 ENCOUNTER — TELEHEALTH PROVIDED OTHER THAN IN PATIENT'S HOME (AMBULATORY)
Dept: URBAN - METROPOLITAN AREA TELEHEALTH 4 | Facility: TELEHEALTH | Age: 55
End: 2020-05-06
Payer: COMMERCIAL

## 2020-05-06 VITALS — HEIGHT: 70 IN

## 2020-05-06 DIAGNOSIS — K21.9 GASTRO-ESOPHAGEAL REFLUX DISEASE WITHOUT ESOPHAGITIS: ICD-10-CM

## 2020-05-06 DIAGNOSIS — Z85.038 PERSONAL HISTORY OF OTHER MALIGNANT NEOPLASM OF LARGE INTEST: ICD-10-CM

## 2020-05-06 DIAGNOSIS — R19.7 DIARRHEA, UNSPECIFIED: ICD-10-CM

## 2020-05-06 DIAGNOSIS — R14.0 ABDOMINAL DISTENSION (GASEOUS): ICD-10-CM

## 2020-05-06 PROCEDURE — 99214 OFFICE O/P EST MOD 30 MIN: CPT | Mod: 95 | Performed by: INTERNAL MEDICINE

## 2020-05-06 RX ORDER — COLESTIPOL HYDROCHLORIDE 1 G/1
3 TABLET, FILM COATED ORAL
Qty: 90 | Refills: 11 | Status: COMPLETED
Start: 2020-05-06 | End: 2021-06-03

## 2020-05-06 RX ORDER — ALUMINUM ZIRCONIUM OCTACHLOROHYDREX GLY 16 G/100G
4 GEL TOPICAL
Qty: 30 | Refills: 5 | Status: COMPLETED
Start: 2020-05-06 | End: 2023-05-04

## 2020-05-27 ENCOUNTER — OFFICE (AMBULATORY)
Dept: URBAN - METROPOLITAN AREA PATHOLOGY 4 | Facility: PATHOLOGY | Age: 55
End: 2020-05-27

## 2020-05-27 ENCOUNTER — ON CAMPUS - OUTPATIENT (AMBULATORY)
Dept: URBAN - METROPOLITAN AREA HOSPITAL 2 | Facility: HOSPITAL | Age: 55
End: 2020-05-27

## 2020-05-27 VITALS
OXYGEN SATURATION: 97 % | RESPIRATION RATE: 12 BRPM | HEART RATE: 70 BPM | SYSTOLIC BLOOD PRESSURE: 111 MMHG | HEART RATE: 75 BPM | SYSTOLIC BLOOD PRESSURE: 122 MMHG | TEMPERATURE: 97.6 F | SYSTOLIC BLOOD PRESSURE: 98 MMHG | DIASTOLIC BLOOD PRESSURE: 73 MMHG | SYSTOLIC BLOOD PRESSURE: 101 MMHG | SYSTOLIC BLOOD PRESSURE: 127 MMHG | DIASTOLIC BLOOD PRESSURE: 57 MMHG | OXYGEN SATURATION: 98 % | DIASTOLIC BLOOD PRESSURE: 77 MMHG | OXYGEN SATURATION: 100 % | HEART RATE: 81 BPM | DIASTOLIC BLOOD PRESSURE: 76 MMHG | HEIGHT: 70 IN | HEART RATE: 74 BPM | HEART RATE: 69 BPM | RESPIRATION RATE: 18 BRPM | DIASTOLIC BLOOD PRESSURE: 95 MMHG | WEIGHT: 230.2 LBS | OXYGEN SATURATION: 99 % | DIASTOLIC BLOOD PRESSURE: 78 MMHG | HEART RATE: 64 BPM | OXYGEN SATURATION: 96 % | DIASTOLIC BLOOD PRESSURE: 68 MMHG | SYSTOLIC BLOOD PRESSURE: 110 MMHG | SYSTOLIC BLOOD PRESSURE: 105 MMHG | SYSTOLIC BLOOD PRESSURE: 113 MMHG | HEART RATE: 71 BPM | HEART RATE: 68 BPM | RESPIRATION RATE: 16 BRPM | SYSTOLIC BLOOD PRESSURE: 95 MMHG | DIASTOLIC BLOOD PRESSURE: 52 MMHG

## 2020-05-27 DIAGNOSIS — K29.50 UNSPECIFIED CHRONIC GASTRITIS WITHOUT BLEEDING: ICD-10-CM

## 2020-05-27 DIAGNOSIS — K21.9 GASTRO-ESOPHAGEAL REFLUX DISEASE WITHOUT ESOPHAGITIS: ICD-10-CM

## 2020-05-27 DIAGNOSIS — Z85.038 PERSONAL HISTORY OF OTHER MALIGNANT NEOPLASM OF LARGE INTEST: ICD-10-CM

## 2020-05-27 DIAGNOSIS — R19.7 DIARRHEA, UNSPECIFIED: ICD-10-CM

## 2020-05-27 DIAGNOSIS — K64.8 OTHER HEMORRHOIDS: ICD-10-CM

## 2020-05-27 PROBLEM — K31.89 OTHER DISEASES OF STOMACH AND DUODENUM: Status: ACTIVE | Noted: 2020-05-27

## 2020-05-27 LAB
GI HISTOLOGY: A. SELECT: (no result)
GI HISTOLOGY: B. UNSPECIFIED: (no result)
GI HISTOLOGY: PDF REPORT: (no result)

## 2020-05-27 PROCEDURE — 45380 COLONOSCOPY AND BIOPSY: CPT | Performed by: INTERNAL MEDICINE

## 2020-05-27 PROCEDURE — 88305 TISSUE EXAM BY PATHOLOGIST: CPT | Performed by: INTERNAL MEDICINE

## 2020-05-27 PROCEDURE — 43239 EGD BIOPSY SINGLE/MULTIPLE: CPT | Performed by: INTERNAL MEDICINE

## 2020-07-02 RX ORDER — FENOFIBRATE 160 MG/1
160 TABLET ORAL DAILY
Qty: 90 TABLET | Refills: 1 | Status: SHIPPED | OUTPATIENT
Start: 2020-07-02 | End: 2021-01-06

## 2020-09-08 RX ORDER — METOPROLOL SUCCINATE 50 MG/1
TABLET, EXTENDED RELEASE ORAL
Qty: 90 TABLET | Refills: 1 | Status: SHIPPED | OUTPATIENT
Start: 2020-09-08 | End: 2021-02-24

## 2020-11-12 ENCOUNTER — OFFICE VISIT (OUTPATIENT)
Dept: CARDIOLOGY | Facility: CLINIC | Age: 55
End: 2020-11-12

## 2020-11-12 VITALS
WEIGHT: 235 LBS | BODY MASS INDEX: 33.64 KG/M2 | HEIGHT: 70 IN | DIASTOLIC BLOOD PRESSURE: 60 MMHG | SYSTOLIC BLOOD PRESSURE: 102 MMHG | HEART RATE: 79 BPM

## 2020-11-12 DIAGNOSIS — E66.9 OBESITY (BMI 30-39.9): ICD-10-CM

## 2020-11-12 DIAGNOSIS — R00.2 PALPITATIONS: Primary | ICD-10-CM

## 2020-11-12 PROCEDURE — 99214 OFFICE O/P EST MOD 30 MIN: CPT | Performed by: INTERNAL MEDICINE

## 2020-11-12 PROCEDURE — 93000 ELECTROCARDIOGRAM COMPLETE: CPT | Performed by: INTERNAL MEDICINE

## 2020-11-12 RX ORDER — PRAMIPEXOLE DIHYDROCHLORIDE 0.12 MG/1
TABLET ORAL
COMMUNITY
Start: 2020-10-28

## 2020-11-12 RX ORDER — ALUMINUM ZIRCONIUM OCTACHLOROHYDREX GLY 16 G/100G
2 GEL TOPICAL 2 TIMES DAILY
COMMUNITY
Start: 2020-08-31

## 2020-11-12 NOTE — PROGRESS NOTES
Date of Office Visit: 20  Encounter Provider: Alek Do MD  Place of Service: [facility] CARDIOLOGY  Patient Name: Ridge Dowd Jr.  :1965    Chief Complaint   Patient presents with   • Follow-up   :     HPI: Ridge Dowd Jr. is a 54 y.o. male who presents back in follow-up.  In  he had a normal stress test and a Holter monitor that showed occasional PVCs.  For this he was started on Toprol-XL therapy.  He then had increased PVCs in 2018 and had another Holter monitor placed as well as an echocardiogram.  His echocardiogram was normal, and his Holter showed frequent PACs (4%) without atrial fibrillation or PVCs.  We have felt that his PACs have been secondary to stress and noncompliance with his CPAP machine.  In 2019 he was diagnosed with colon cancer and underwent an open transverse to left colectomy with primary anastomosis.  This was found to be adenocarcinoma.  It looks like he also underwent chemotherapy.    Since our last visit he has been doing very well.  He denies any chest pain or dyspnea.  He states that his palpitations are infrequent and tolerable.  His diabetes has been well controlled.          Past Medical History:   Diagnosis Date   • Cancer (CMS/HCC)     colon   • Diabetes mellitus (CMS/HCC)    • History of hyperlipidemia     with significant hypertriglyceridemia   • History of obesity    • History of obstructive sleep apnea     Not on CPAP   • Personal history of asthma    • Personal history of renal calculi    • PVC (premature ventricular contraction)     history of PVC       Past Surgical History:   Procedure Laterality Date   • APPENDECTOMY     • CHOLECYSTECTOMY     • COLON RESECTION  2019   • OTHER SURGICAL HISTORY      lithotripsy   • PYLOROMYOTOMY     • TONSILLECTOMY         Social History     Socioeconomic History   • Marital status:      Spouse name: Not on file   • Number of children: Not on file   •  Years of education: Not on file   • Highest education level: Not on file   Tobacco Use   • Smoking status: Never Smoker   • Smokeless tobacco: Never Used   Substance and Sexual Activity   • Alcohol use: No     Comment: Daily caffeine use   • Drug use: No       Family History   Problem Relation Age of Onset   • Coronary artery disease Father         s/p 2 separate MI's.  Also has cardiomyopathy and has undergone ICD placement.   • Diabetes Father    • No Known Problems Mother        Review of Systems   Constitution: Negative for chills, fever and malaise/fatigue.   Cardiovascular: Negative for chest pain, dyspnea on exertion, leg swelling, near-syncope, orthopnea, palpitations, paroxysmal nocturnal dyspnea and syncope.   Respiratory: Negative for cough and shortness of breath.    Musculoskeletal: Negative for joint pain, joint swelling and myalgias.   Gastrointestinal: Negative for abdominal pain, diarrhea, melena, nausea and vomiting.   Genitourinary: Negative for frequency and hematuria.   Neurological: Positive for dizziness. Negative for light-headedness, numbness, paresthesias and seizures.   Allergic/Immunologic: Negative.    All other systems reviewed and are negative.      Allergies   Allergen Reactions   • Hydrocodone-Acetaminophen Itching   • Oxycodone Itching   • Oxycodone-Acetaminophen Itching   • Chlorhexidine Itching     Itching when chg used to clean skin prior to port access and when chg impregnated dressings used over port site.         Current Outpatient Medications:   •  chlorthalidone (HYGROTON) 25 MG tablet, Take  by mouth Daily., Disp: , Rfl:   •  escitalopram (LEXAPRO) 20 MG tablet, Take 20 mg by mouth Daily., Disp: , Rfl:   •  fenofibrate 160 MG tablet, TAKE 1 TABLET BY MOUTH DAILY, Disp: 90 tablet, Rfl: 1  •  fexofenadine-pseudoephedrine (ALLEGRA-D 24) 180-240 MG per 24 hr tablet, Take 1 tablet by mouth Daily., Disp: , Rfl:   •  meloxicam (MOBIC) 15 MG tablet, Take  by mouth Daily., Disp: ,  "Rfl:   •  metFORMIN (GLUCOPHAGE) 1000 MG tablet, Take 1,000 mg by mouth 2 (Two) Times a Day With Meals., Disp: , Rfl:   •  metoprolol succinate XL (TOPROL XL) 100 MG 24 hr tablet, Take  by mouth Daily., Disp: , Rfl:   •  metoprolol succinate XL (TOPROL-XL) 50 MG 24 hr tablet, TAKE 1 TABLET BY MOUTH DAILY IN ADDITION  MG TABLET FOR A TOTAL  MG DAILY, Disp: 90 tablet, Rfl: 1  •  montelukast (SINGULAIR) 10 MG tablet, Take 10 mg by mouth Every Night., Disp: , Rfl:   •  Multiple Vitamins-Minerals (MULTIVITAMIN PO), Take  by mouth., Disp: , Rfl:   •  NOVOLOG FLEXPEN 100 UNIT/ML solution pen-injector sc pen, , Disp: , Rfl:   •  omeprazole (priLOSEC) 40 MG capsule, Take 40 mg by mouth Daily., Disp: , Rfl:   •  ondansetron (ZOFRAN) 4 MG tablet, Take 1 tablet by mouth Every 6 (Six) Hours As Needed for Nausea or Vomiting., Disp: 12 tablet, Rfl: 0  •  potassium chloride (K-DUR) 10 MEQ CR tablet, Take 10 mEq by mouth Daily., Disp: , Rfl:   •  rOPINIRole (REQUIP) 0.5 MG tablet, Take 0.5 mg by mouth Daily., Disp: , Rfl:   •  Semaglutide, 1 MG/DOSE, (OZEMPIC, 1 MG/DOSE,) 2 MG/1.5ML solution pen-injector, 1 (One) Time Per Week., Disp: , Rfl:   •  traZODone (DESYREL) 150 MG tablet, Take 150 mg by mouth Daily., Disp: , Rfl:   •  TRESIBA FLEXTOUCH 200 UNIT/ML solution pen-injector, INJECT 110 UNITS UNDER THE SKIN ONCE DAILY, Disp: , Rfl: 3      Objective:     Vitals:    11/12/20 1126   Height: 177.8 cm (70\")     Body mass index is 36.73 kg/m².    PHYSICAL EXAM:    Physical Exam   Constitutional: He is oriented to person, place, and time. He appears well-developed and well-nourished. No distress.   HENT:   Head: Normocephalic and atraumatic.   Eyes: Pupils are equal, round, and reactive to light.   Neck: No JVD present. No thyromegaly present.   Cardiovascular: Normal rate, regular rhythm, normal heart sounds and intact distal pulses.   No murmur heard.  Pulmonary/Chest: Effort normal and breath sounds normal. No respiratory " distress.   Abdominal: Soft. Bowel sounds are normal. He exhibits no distension. There is no splenomegaly or hepatomegaly. There is no abdominal tenderness.   Musculoskeletal: Normal range of motion.         General: No edema.   Neurological: He is alert and oriented to person, place, and time.   Skin: Skin is warm and dry. He is not diaphoretic. No erythema.   Psychiatric: He has a normal mood and affect. His behavior is normal. Judgment normal.         ECG 12 Lead    Date/Time: 11/12/2020 11:57 AM  Performed by: Alek Do MD  Authorized by: Alek Do MD   Comparison: compared with previous ECG from 11/7/2019  Rhythm: sinus rhythm  Rate: normal  QRS axis: normal    Clinical impression: normal ECG              Assessment:      No diagnosis found.  No orders of the defined types were placed in this encounter.         Plan:       1.  Palpitations.  These have been attributed to PACs.  He has done well on beta-blocker therapy.  He has had a normal echocardiogram and in the remote past a normal stress test.  I am going to keep him on his Toprol-XL at its current dose.    2.  Diabetes mellitus: Per primary.  This has been well controlled.    He did have a coronary calcium score in February 2020 and did have a score less than 100 with calcium mostly in the LAD.  This is low calcium scoring does not put him at high risk.

## 2020-12-10 ENCOUNTER — OFFICE (AMBULATORY)
Dept: URBAN - METROPOLITAN AREA CLINIC 64 | Facility: CLINIC | Age: 55
End: 2020-12-10
Payer: COMMERCIAL

## 2020-12-10 VITALS
DIASTOLIC BLOOD PRESSURE: 61 MMHG | HEIGHT: 70 IN | HEART RATE: 73 BPM | WEIGHT: 240 LBS | SYSTOLIC BLOOD PRESSURE: 93 MMHG

## 2020-12-10 DIAGNOSIS — M54.5 LOW BACK PAIN: ICD-10-CM

## 2020-12-10 DIAGNOSIS — C18.9 MALIGNANT NEOPLASM OF COLON, UNSPECIFIED: ICD-10-CM

## 2020-12-10 DIAGNOSIS — R11.0 NAUSEA: ICD-10-CM

## 2020-12-10 DIAGNOSIS — K21.9 GASTRO-ESOPHAGEAL REFLUX DISEASE WITHOUT ESOPHAGITIS: ICD-10-CM

## 2020-12-10 DIAGNOSIS — R19.7 DIARRHEA, UNSPECIFIED: ICD-10-CM

## 2020-12-10 DIAGNOSIS — R14.0 ABDOMINAL DISTENSION (GASEOUS): ICD-10-CM

## 2020-12-10 PROCEDURE — 99214 OFFICE O/P EST MOD 30 MIN: CPT | Performed by: NURSE PRACTITIONER

## 2020-12-10 RX ORDER — DICYCLOMINE HYDROCHLORIDE 20 MG/1
80 TABLET ORAL
Qty: 30 | Refills: 2 | Status: COMPLETED
Start: 2020-12-10 | End: 2022-08-19

## 2020-12-10 RX ORDER — DIPHENOXYLATE HYDROCHLORIDE AND ATROPINE SULFATE 2.5; .025 MG/1; MG/1
7.5 TABLET ORAL
Qty: 30 | Refills: 2 | Status: ACTIVE
Start: 2020-12-10

## 2020-12-10 RX ORDER — ACTIVATED CHARCOAL 260 MG
780 CAPSULE ORAL
Qty: 99 | Refills: 9 | Status: COMPLETED
Start: 2020-12-10 | End: 2021-06-03

## 2021-02-24 RX ORDER — METOPROLOL SUCCINATE 50 MG/1
TABLET, EXTENDED RELEASE ORAL
Qty: 90 TABLET | Refills: 1 | Status: SHIPPED | OUTPATIENT
Start: 2021-02-24 | End: 2021-09-29 | Stop reason: SDUPTHER

## 2021-06-03 ENCOUNTER — OFFICE (AMBULATORY)
Dept: URBAN - METROPOLITAN AREA CLINIC 64 | Facility: CLINIC | Age: 56
End: 2021-06-03

## 2021-06-03 VITALS
WEIGHT: 240 LBS | DIASTOLIC BLOOD PRESSURE: 64 MMHG | SYSTOLIC BLOOD PRESSURE: 96 MMHG | HEART RATE: 74 BPM | HEIGHT: 70 IN

## 2021-06-03 DIAGNOSIS — R14.0 ABDOMINAL DISTENSION (GASEOUS): ICD-10-CM

## 2021-06-03 DIAGNOSIS — Z90.49 ACQUIRED ABSENCE OF OTHER SPECIFIED PARTS OF DIGESTIVE TRACT: ICD-10-CM

## 2021-06-03 DIAGNOSIS — K21.9 GASTRO-ESOPHAGEAL REFLUX DISEASE WITHOUT ESOPHAGITIS: ICD-10-CM

## 2021-06-03 DIAGNOSIS — Z85.038 PERSONAL HISTORY OF OTHER MALIGNANT NEOPLASM OF LARGE INTEST: ICD-10-CM

## 2021-06-03 DIAGNOSIS — R19.7 DIARRHEA, UNSPECIFIED: ICD-10-CM

## 2021-06-03 PROCEDURE — 99213 OFFICE O/P EST LOW 20 MIN: CPT | Performed by: NURSE PRACTITIONER

## 2021-06-03 RX ORDER — OMEPRAZOLE 20 MG/1
CAPSULE, DELAYED RELEASE ORAL
Qty: 90 | Refills: 4 | Status: ACTIVE

## 2021-06-07 PROCEDURE — U0003 INFECTIOUS AGENT DETECTION BY NUCLEIC ACID (DNA OR RNA); SEVERE ACUTE RESPIRATORY SYNDROME CORONAVIRUS 2 (SARS-COV-2) (CORONAVIRUS DISEASE [COVID-19]), AMPLIFIED PROBE TECHNIQUE, MAKING USE OF HIGH THROUGHPUT TECHNOLOGIES AS DESCRIBED BY CMS-2020-01-R: HCPCS | Performed by: FAMILY MEDICINE

## 2021-07-01 RX ORDER — FENOFIBRATE 160 MG/1
160 TABLET ORAL DAILY
Qty: 90 TABLET | Refills: 4 | Status: SHIPPED | OUTPATIENT
Start: 2021-07-01 | End: 2022-11-14

## 2021-09-29 RX ORDER — METOPROLOL SUCCINATE 50 MG/1
TABLET, EXTENDED RELEASE ORAL
Qty: 90 TABLET | Refills: 1 | Status: SHIPPED | OUTPATIENT
Start: 2021-09-29 | End: 2022-03-23

## 2021-11-24 ENCOUNTER — OFFICE VISIT (OUTPATIENT)
Dept: CARDIOLOGY | Facility: CLINIC | Age: 56
End: 2021-11-24

## 2021-11-24 VITALS
HEIGHT: 70 IN | HEART RATE: 80 BPM | WEIGHT: 233 LBS | BODY MASS INDEX: 33.36 KG/M2 | DIASTOLIC BLOOD PRESSURE: 70 MMHG | SYSTOLIC BLOOD PRESSURE: 120 MMHG

## 2021-11-24 DIAGNOSIS — I49.3 PVC'S (PREMATURE VENTRICULAR CONTRACTIONS): Primary | ICD-10-CM

## 2021-11-24 DIAGNOSIS — R93.1 AGATSTON CORONARY ARTERY CALCIUM SCORE LESS THAN 100: ICD-10-CM

## 2021-11-24 DIAGNOSIS — R00.2 HEART PALPITATIONS: ICD-10-CM

## 2021-11-24 PROCEDURE — 93000 ELECTROCARDIOGRAM COMPLETE: CPT | Performed by: NURSE PRACTITIONER

## 2021-11-24 PROCEDURE — 99214 OFFICE O/P EST MOD 30 MIN: CPT | Performed by: NURSE PRACTITIONER

## 2021-11-24 RX ORDER — ROSUVASTATIN CALCIUM 20 MG/1
20 TABLET, COATED ORAL DAILY
Qty: 90 TABLET | Refills: 0 | Status: SHIPPED | OUTPATIENT
Start: 2021-11-24 | End: 2022-02-28

## 2021-11-24 RX ORDER — DICYCLOMINE HCL 20 MG
TABLET ORAL
COMMUNITY
Start: 2021-11-15

## 2022-02-25 ENCOUNTER — TELEPHONE (OUTPATIENT)
Dept: CARDIOLOGY | Facility: CLINIC | Age: 57
End: 2022-02-25

## 2022-02-25 DIAGNOSIS — R93.1 AGATSTON CORONARY ARTERY CALCIUM SCORE LESS THAN 100: Primary | ICD-10-CM

## 2022-02-28 RX ORDER — PRAVASTATIN SODIUM 20 MG
20 TABLET ORAL DAILY
Qty: 30 TABLET | Refills: 11 | Status: SHIPPED | OUTPATIENT
Start: 2022-02-28 | End: 2023-03-06

## 2022-03-23 RX ORDER — METOPROLOL SUCCINATE 50 MG/1
TABLET, EXTENDED RELEASE ORAL
Qty: 90 TABLET | Refills: 1 | Status: SHIPPED | OUTPATIENT
Start: 2022-03-23 | End: 2022-09-22

## 2022-08-19 ENCOUNTER — OFFICE (AMBULATORY)
Dept: URBAN - METROPOLITAN AREA CLINIC 64 | Facility: CLINIC | Age: 57
End: 2022-08-19
Payer: COMMERCIAL

## 2022-08-19 VITALS — DIASTOLIC BLOOD PRESSURE: 62 MMHG | SYSTOLIC BLOOD PRESSURE: 102 MMHG | HEIGHT: 70 IN | HEART RATE: 76 BPM

## 2022-08-19 DIAGNOSIS — R19.7 DIARRHEA, UNSPECIFIED: ICD-10-CM

## 2022-08-19 PROCEDURE — 99214 OFFICE O/P EST MOD 30 MIN: CPT | Performed by: NURSE PRACTITIONER

## 2022-08-19 RX ORDER — OMEPRAZOLE 20 MG/1
CAPSULE, DELAYED RELEASE ORAL
Qty: 90 | Refills: 4 | Status: ACTIVE

## 2022-09-22 RX ORDER — METOPROLOL SUCCINATE 50 MG/1
TABLET, EXTENDED RELEASE ORAL
Qty: 90 TABLET | Refills: 1 | Status: SHIPPED | OUTPATIENT
Start: 2022-09-22 | End: 2022-10-17 | Stop reason: SDUPTHER

## 2022-10-17 RX ORDER — METOPROLOL SUCCINATE 100 MG/1
100 TABLET, EXTENDED RELEASE ORAL EVERY MORNING
Qty: 90 TABLET | Refills: 4 | Status: SHIPPED | OUTPATIENT
Start: 2022-10-17

## 2022-10-17 RX ORDER — METOPROLOL SUCCINATE 50 MG/1
50 TABLET, EXTENDED RELEASE ORAL EVERY EVENING
Qty: 90 TABLET | Refills: 4 | Status: SHIPPED | OUTPATIENT
Start: 2022-10-17

## 2022-11-11 ENCOUNTER — OFFICE VISIT (OUTPATIENT)
Dept: CARDIOLOGY | Facility: CLINIC | Age: 57
End: 2022-11-11

## 2022-11-11 VITALS
HEART RATE: 71 BPM | WEIGHT: 221.4 LBS | SYSTOLIC BLOOD PRESSURE: 110 MMHG | BODY MASS INDEX: 31.7 KG/M2 | HEIGHT: 70 IN | DIASTOLIC BLOOD PRESSURE: 70 MMHG | OXYGEN SATURATION: 91 %

## 2022-11-11 DIAGNOSIS — R93.1 AGATSTON CORONARY ARTERY CALCIUM SCORE LESS THAN 100: Primary | ICD-10-CM

## 2022-11-11 DIAGNOSIS — I49.3 PVC'S (PREMATURE VENTRICULAR CONTRACTIONS): ICD-10-CM

## 2022-11-11 PROCEDURE — 99214 OFFICE O/P EST MOD 30 MIN: CPT | Performed by: NURSE PRACTITIONER

## 2022-11-11 PROCEDURE — 93000 ELECTROCARDIOGRAM COMPLETE: CPT | Performed by: NURSE PRACTITIONER

## 2022-11-11 NOTE — PROGRESS NOTES
Date of Office Visit: 2022  Encounter Provider: CHRISTIANO Charles  Place of Service: [facility] CARDIOLOGY  Patient Name: Ridge Dowd Jr.  :1965    Chief Complaint   Patient presents with   • Coronary Artery Disease   :     HPI: Ridge Dowd Jr. is a 56 y.o. male.  He is a patient of Dr. Do's whom we follow for the management of palpitations/PVCs and coronary artery disease.  In , he was diagnosed with colon cancer and underwent an open transverse to left colectomy with primary anastomosis.  He also underwent chemotherapy.  In 2020, he underwent a coronary calcium score demonstrating total score of 98 with calcium mostly in the LAD.       I last saw him in the office in 2021 at which time he was doing well.  He reported mild lingering shortness of breath on exertion since his COVID infection the previous September.  I did recommend initiating rosuvastatin.  Unfortunately, he did not tolerate this medication.  He was subsequently started on pravastatin.   He has been doing well.  He denies any chest pain, shortness of breath, edema, dizziness, or syncope.  He reports infrequent palpitations.  In September, he became a full-time .    Past Medical History:   Diagnosis Date   • Cancer (HCC)     colon   • Diabetes mellitus (HCC)    • History of hyperlipidemia     with significant hypertriglyceridemia   • History of obesity    • History of obstructive sleep apnea     Not on CPAP   • Personal history of asthma    • Personal history of renal calculi    • PVC (premature ventricular contraction)     history of PVC       Past Surgical History:   Procedure Laterality Date   • APPENDECTOMY     • CHOLECYSTECTOMY     • COLON RESECTION  2019   • OTHER SURGICAL HISTORY      lithotripsy   • PYLOROMYOTOMY     • TONSILLECTOMY         Social History     Socioeconomic History   • Marital status:    Tobacco Use   • Smoking status: Never   •  Smokeless tobacco: Never   Substance and Sexual Activity   • Alcohol use: No     Comment: Daily caffeine use   • Drug use: No       Family History   Problem Relation Age of Onset   • Coronary artery disease Father         s/p 2 separate MI's.  Also has cardiomyopathy and has undergone ICD placement.   • Diabetes Father    • No Known Problems Mother        Review of Systems   Constitutional: Negative.   Cardiovascular: Positive for palpitations. Negative for chest pain, dyspnea on exertion, leg swelling, orthopnea, paroxysmal nocturnal dyspnea and syncope.   Respiratory: Negative.    Hematologic/Lymphatic: Negative for bleeding problem.   Musculoskeletal: Negative for falls.   Gastrointestinal: Negative for melena.   Neurological: Negative for dizziness and light-headedness.       Allergies   Allergen Reactions   • Hydrocodone-Acetaminophen Itching   • Oxycodone Itching   • Oxycodone-Acetaminophen Itching   • Chlorhexidine Itching     Itching when chg used to clean skin prior to port access and when chg impregnated dressings used over port site.         Current Outpatient Medications:   •  chlorthalidone (HYGROTON) 25 MG tablet, Take  by mouth Daily., Disp: , Rfl:   •  dapagliflozin (FARXIGA) 5 MG tablet tablet, Take 5 mg by mouth Daily., Disp: , Rfl:   •  dicyclomine (BENTYL) 20 MG tablet, , Disp: , Rfl:   •  escitalopram (LEXAPRO) 20 MG tablet, Take 20 mg by mouth Daily., Disp: , Rfl:   •  fenofibrate 160 MG tablet, TAKE 1 TABLET BY MOUTH DAILY, Disp: 90 tablet, Rfl: 4  •  fexofenadine-pseudoephedrine (ALLEGRA-D 24) 180-240 MG per 24 hr tablet, Take 1 tablet by mouth Daily., Disp: , Rfl:   •  Insulin Glargine (BASAGLAR KWIKPEN) 100 UNIT/ML injection pen, Inject  under the skin into the appropriate area as directed., Disp: , Rfl:   •  meloxicam (MOBIC) 15 MG tablet, Take  by mouth Daily., Disp: , Rfl:   •  metFORMIN (GLUCOPHAGE) 1000 MG tablet, Take 1,000 mg by mouth 2 (Two) Times a Day With Meals., Disp: , Rfl:   •  " metoprolol succinate XL (Toprol XL) 100 MG 24 hr tablet, Take 1 tablet by mouth Every Morning., Disp: 90 tablet, Rfl: 4  •  metoprolol succinate XL (TOPROL-XL) 50 MG 24 hr tablet, Take 1 tablet by mouth Every Evening., Disp: 90 tablet, Rfl: 4  •  montelukast (SINGULAIR) 10 MG tablet, Take 10 mg by mouth Every Night., Disp: , Rfl:   •  Multiple Vitamins-Minerals (MULTIVITAMIN PO), Take  by mouth., Disp: , Rfl:   •  NOVOLOG FLEXPEN 100 UNIT/ML solution pen-injector sc pen, , Disp: , Rfl:   •  omeprazole (priLOSEC) 40 MG capsule, Take 40 mg by mouth Daily., Disp: , Rfl:   •  ondansetron (ZOFRAN) 4 MG tablet, Take 1 tablet by mouth Every 6 (Six) Hours As Needed for Nausea or Vomiting., Disp: 12 tablet, Rfl: 0  •  pramipexole (MIRAPEX) 0.125 MG tablet, TAKE 1 T PO EVERY DAY AND TAKE 2 TS PO EVERY NIGHT, Disp: , Rfl:   •  pravastatin (Pravachol) 20 MG tablet, Take 1 tablet by mouth Daily., Disp: 30 tablet, Rfl: 11  •  Probiotic Product (Align) capsule, Take 2 capsules by mouth 2 (two) times a day., Disp: , Rfl:   •  Semaglutide, 1 MG/DOSE, (OZEMPIC) 2 MG/1.5ML solution pen-injector, 1 (One) Time Per Week., Disp: , Rfl:   •  traZODone (DESYREL) 150 MG tablet, Take 150 mg by mouth Daily., Disp: , Rfl:   •  benzonatate (TESSALON) 200 MG capsule, 1 capsule p.o. every 8 hours as needed cough, Disp: 21 capsule, Rfl: 0      Objective:     Vitals:    11/11/22 1042   BP: 110/70   BP Location: Right arm   Patient Position: Sitting   Cuff Size: Adult   Pulse: 71   SpO2: 91%   Weight: 100 kg (221 lb 6.4 oz)   Height: 177.8 cm (70\")     Body mass index is 31.77 kg/m².    PHYSICAL EXAM:    Neck:      Vascular: No JVD.   Pulmonary:      Effort: Pulmonary effort is normal.      Breath sounds: Normal breath sounds.   Cardiovascular:      Normal rate. Regular rhythm.      Murmurs: There is no murmur.      No gallop. No click. No rub.   Pulses:     Intact distal pulses.           ECG 12 Lead    Date/Time: 11/11/2022 10:51 AM  Performed by: " Oneyda Roberson APRN  Authorized by: Oneyda Roberson APRN   Comparison: compared with previous ECG from 11/24/2021  Similar to previous ECG  Rhythm: sinus rhythm  Rate: normal  BPM: 68  T inversion: II, III, aVF, V5 and V6                Assessment:       Diagnosis Plan   1. Agatston coronary artery calcium score less than 100  ECG 12 Lead      2. PVC's (premature ventricular contractions)          Orders Placed This Encounter   Procedures   • ECG 12 Lead     This order was created via procedure documentation     Order Specific Question:   Release to patient     Answer:   Routine Release          Plan:       1.  Elevated calcium score.  He denies any anginal symptoms.  He is on a statin.  I did recommend he start baby aspirin daily.      2.  PVCs.  Stable on metoprolol.      I think he is doing well.  I am not making any changes, and he will follow-up with Dr. Do in 1 year      As always, it has been a pleasure to participate in your patient's care.      Sincerely,         CHRISTIANO Calderón

## 2022-11-14 RX ORDER — FENOFIBRATE 160 MG/1
TABLET ORAL
Qty: 90 TABLET | Refills: 4 | Status: SHIPPED | OUTPATIENT
Start: 2022-11-14

## 2023-03-06 RX ORDER — PRAVASTATIN SODIUM 20 MG
TABLET ORAL
Qty: 30 TABLET | Refills: 11 | Status: SHIPPED | OUTPATIENT
Start: 2023-03-06

## 2023-05-04 ENCOUNTER — OFFICE (AMBULATORY)
Dept: URBAN - METROPOLITAN AREA CLINIC 64 | Facility: CLINIC | Age: 58
End: 2023-05-04

## 2023-05-04 VITALS
HEART RATE: 78 BPM | WEIGHT: 221 LBS | SYSTOLIC BLOOD PRESSURE: 111 MMHG | DIASTOLIC BLOOD PRESSURE: 73 MMHG | HEIGHT: 70 IN

## 2023-05-04 DIAGNOSIS — Z79.4 LONG TERM (CURRENT) USE OF INSULIN: ICD-10-CM

## 2023-05-04 DIAGNOSIS — R19.4 CHANGE IN BOWEL HABIT: ICD-10-CM

## 2023-05-04 DIAGNOSIS — C18.9 MALIGNANT NEOPLASM OF COLON, UNSPECIFIED: ICD-10-CM

## 2023-05-04 DIAGNOSIS — K21.9 GASTRO-ESOPHAGEAL REFLUX DISEASE WITHOUT ESOPHAGITIS: ICD-10-CM

## 2023-05-04 DIAGNOSIS — R19.7 DIARRHEA, UNSPECIFIED: ICD-10-CM

## 2023-05-04 PROCEDURE — 99214 OFFICE O/P EST MOD 30 MIN: CPT | Performed by: NURSE PRACTITIONER

## 2023-06-30 ENCOUNTER — ON CAMPUS - OUTPATIENT (AMBULATORY)
Dept: URBAN - METROPOLITAN AREA HOSPITAL 2 | Facility: HOSPITAL | Age: 58
End: 2023-06-30
Payer: COMMERCIAL

## 2023-06-30 VITALS
HEART RATE: 67 BPM | RESPIRATION RATE: 19 BRPM | HEART RATE: 76 BPM | DIASTOLIC BLOOD PRESSURE: 62 MMHG | WEIGHT: 214 LBS | SYSTOLIC BLOOD PRESSURE: 97 MMHG | RESPIRATION RATE: 17 BRPM | DIASTOLIC BLOOD PRESSURE: 85 MMHG | HEART RATE: 71 BPM | HEART RATE: 75 BPM | DIASTOLIC BLOOD PRESSURE: 66 MMHG | SYSTOLIC BLOOD PRESSURE: 111 MMHG | DIASTOLIC BLOOD PRESSURE: 84 MMHG | HEART RATE: 73 BPM | SYSTOLIC BLOOD PRESSURE: 100 MMHG | OXYGEN SATURATION: 97 % | RESPIRATION RATE: 14 BRPM | HEIGHT: 70 IN | SYSTOLIC BLOOD PRESSURE: 94 MMHG | DIASTOLIC BLOOD PRESSURE: 71 MMHG | HEART RATE: 69 BPM | RESPIRATION RATE: 16 BRPM | SYSTOLIC BLOOD PRESSURE: 122 MMHG | OXYGEN SATURATION: 100 % | OXYGEN SATURATION: 98 % | DIASTOLIC BLOOD PRESSURE: 65 MMHG | DIASTOLIC BLOOD PRESSURE: 78 MMHG | SYSTOLIC BLOOD PRESSURE: 118 MMHG | SYSTOLIC BLOOD PRESSURE: 112 MMHG | TEMPERATURE: 97.1 F | RESPIRATION RATE: 12 BRPM | HEART RATE: 72 BPM | OXYGEN SATURATION: 99 %

## 2023-06-30 DIAGNOSIS — Z85.038 PERSONAL HISTORY OF OTHER MALIGNANT NEOPLASM OF LARGE INTEST: ICD-10-CM

## 2023-06-30 PROCEDURE — 45378 DIAGNOSTIC COLONOSCOPY: CPT | Mod: 33 | Performed by: INTERNAL MEDICINE

## 2023-11-10 ENCOUNTER — TELEPHONE (OUTPATIENT)
Dept: CARDIOLOGY | Facility: CLINIC | Age: 58
End: 2023-11-10
Payer: COMMERCIAL

## 2023-11-10 NOTE — TELEPHONE ENCOUNTER
"    Caller: Ridge Dowd Jr. \"Richard\"    Relationship to patient: Self    Best call back number: [phone] (home)       Chief complaint: NEEDS TO R/S APPT    Type of visit: 1 YR F/U    Requested date: FIRST AVAILABLE     If rescheduling, when is the original appointment: 11/20/23     Additional notes: PT HAS WORK OBLIGATIONS AT THE ORIGINAL APPT TIME. I TRIED TO R/S WITH DR BRAGG AND HE'S BOOKED OUT UNTIL 2025. PLEASE CALL PT TO R/S TO ENSURE HE CAN MAKE IT WITH WORK SCHEDULE.           "

## 2023-11-15 ENCOUNTER — OFFICE VISIT (OUTPATIENT)
Age: 58
End: 2023-11-15
Payer: COMMERCIAL

## 2023-11-15 VITALS
BODY MASS INDEX: 31.92 KG/M2 | SYSTOLIC BLOOD PRESSURE: 110 MMHG | WEIGHT: 223 LBS | HEIGHT: 70 IN | DIASTOLIC BLOOD PRESSURE: 68 MMHG | HEART RATE: 69 BPM

## 2023-11-15 DIAGNOSIS — R93.1 AGATSTON CORONARY ARTERY CALCIUM SCORE LESS THAN 100: Primary | ICD-10-CM

## 2023-11-15 DIAGNOSIS — I49.3 PVC'S (PREMATURE VENTRICULAR CONTRACTIONS): ICD-10-CM

## 2023-11-15 PROCEDURE — 93000 ELECTROCARDIOGRAM COMPLETE: CPT | Performed by: INTERNAL MEDICINE

## 2023-11-15 PROCEDURE — 99214 OFFICE O/P EST MOD 30 MIN: CPT | Performed by: INTERNAL MEDICINE

## 2023-11-15 RX ORDER — INSULIN GLARGINE 100 [IU]/ML
50 INJECTION, SOLUTION SUBCUTANEOUS DAILY
COMMUNITY
Start: 2023-06-19

## 2023-11-15 RX ORDER — BUPROPION HYDROCHLORIDE 100 MG/1
100 TABLET, EXTENDED RELEASE ORAL DAILY
COMMUNITY
Start: 2023-06-15

## 2023-11-15 RX ORDER — ONDANSETRON HYDROCHLORIDE 8 MG/1
8 TABLET, FILM COATED ORAL EVERY 8 HOURS PRN
COMMUNITY
Start: 2023-11-13

## 2023-11-15 NOTE — PROGRESS NOTES
Date of Office Visit: 11/15/23    Encounter Provider: Alek Do MD  Place of Service: [facility] CARDIOLOGY  Patient Name: Ridge Dowd Jr.  :1965    Chief Complaint   Patient presents with    PVCs    Follow-up     1 year   Coronary artery calcification    HPI: Ridge Dowd Jr. is a 57 y.o. male.  He has a medical history of palpitations/PVCs and coronary artery calcification.  In , he was diagnosed with colon cancer and underwent an open transverse to left colectomy with primary anastomosis.  He also underwent chemotherapy.  In 2020, he underwent a coronary calcium score demonstrating total score of 98 with calcium mostly in the LAD.        I last saw him in the office in 2021 at which time he was doing well.  He reported mild lingering shortness of breath on exertion since his COVID infection the previous September.  I did recommend initiating rosuvastatin.  Unfortunately, he did not tolerate this medication.  He was subsequently started on pravastatin.    Since that time he has been doing well.  He states that his palpitations are extremely infrequent and for the most part he does not feel them.  He does report that he had 1 episode of lightheadedness and lower blood pressures on  but has not had any recurrence since then.  He did not have a syncopal episode.    Past Medical History:   Diagnosis Date    Abnormal ECG     P.A.C's    Arrhythmia Unk    Asthma Unk    Cancer     colon    Diabetes mellitus     History of hyperlipidemia     with significant hypertriglyceridemia    History of obesity     History of obstructive sleep apnea     Not on CPAP    Personal history of asthma     Personal history of renal calculi     PVC (premature ventricular contraction)     history of PVC       Past Surgical History:   Procedure Laterality Date    APPENDECTOMY      CHOLECYSTECTOMY      COLON RESECTION  2019    OTHER SURGICAL HISTORY       lithotripsy    PYLOROMYOTOMY      TONSILLECTOMY         Social History     Socioeconomic History    Marital status:    Tobacco Use    Smoking status: Never    Smokeless tobacco: Never   Substance and Sexual Activity    Alcohol use: Yes     Alcohol/week: 1.0 standard drink of alcohol     Types: 1 Cans of beer per week     Comment: Daily caffeine use    Drug use: Never    Sexual activity: Yes     Partners: Female     Birth control/protection: Vasectomy, Surgical     Comment: Vasectomy       Family History   Problem Relation Age of Onset    Coronary artery disease Father         s/p 2 separate MI's.  Also has cardiomyopathy and has undergone ICD placement.    Diabetes Father     Arrhythmia Father     Asthma Father     Heart attack Father     No Known Problems Mother        Review of Systems   Constitutional: Negative. Negative for fever and malaise/fatigue.   HENT:  Negative for nosebleeds and sore throat.    Eyes:  Negative for blurred vision and double vision.   Cardiovascular:  Positive for palpitations. Negative for chest pain, claudication, dyspnea on exertion, leg swelling, orthopnea, paroxysmal nocturnal dyspnea and syncope.   Respiratory: Negative.  Negative for cough, shortness of breath and snoring.    Endocrine: Negative for cold intolerance, heat intolerance and polydipsia.   Hematologic/Lymphatic: Negative for bleeding problem.   Skin:  Negative for itching, poor wound healing and rash.   Musculoskeletal:  Negative for falls, joint pain, joint swelling, muscle weakness and myalgias.   Gastrointestinal:  Negative for abdominal pain, melena, nausea and vomiting.   Neurological:  Negative for dizziness, light-headedness, loss of balance, seizures, vertigo and weakness.   Psychiatric/Behavioral:  Negative for altered mental status and depression.        Allergies   Allergen Reactions    Hydrocodone-Acetaminophen Itching    Oxycodone Itching    Oxycodone-Acetaminophen Itching    Chlorhexidine Itching      Itching when chg used to clean skin prior to port access and when chg impregnated dressings used over port site.         Current Outpatient Medications:     ASPIRIN 81 PO, Take 1 tablet by mouth Daily., Disp: , Rfl:     benzonatate (TESSALON) 200 MG capsule, 1 capsule p.o. every 8 hours as needed cough, Disp: 21 capsule, Rfl: 0    buPROPion SR (WELLBUTRIN SR) 100 MG 12 hr tablet, Take 1 tablet by mouth Daily., Disp: , Rfl:     chlorthalidone (HYGROTON) 25 MG tablet, Take  by mouth Daily., Disp: , Rfl:     dapagliflozin Propanediol 10 MG tablet, Take 10 mg by mouth Daily., Disp: , Rfl:     dicyclomine (BENTYL) 20 MG tablet, , Disp: , Rfl:     escitalopram (LEXAPRO) 20 MG tablet, Take 1 tablet by mouth Daily., Disp: , Rfl:     fenofibrate 160 MG tablet, TAKE ONE TABLET BY MOUTH DAILY, Disp: 90 tablet, Rfl: 4    fexofenadine-pseudoephedrine (ALLEGRA-D 24) 180-240 MG per 24 hr tablet, Take 1 tablet by mouth Daily., Disp: , Rfl:     Insulin Glargine (BASAGLAR KWIKPEN) 100 UNIT/ML injection pen, Inject 50 Units under the skin into the appropriate area as directed Daily., Disp: , Rfl:     meloxicam (MOBIC) 15 MG tablet, Take  by mouth Daily., Disp: , Rfl:     metFORMIN (GLUCOPHAGE) 1000 MG tablet, Take 1 tablet by mouth 2 (Two) Times a Day With Meals., Disp: , Rfl:     metoprolol succinate XL (Toprol XL) 100 MG 24 hr tablet, Take 1 tablet by mouth Every Morning., Disp: 90 tablet, Rfl: 4    metoprolol succinate XL (TOPROL-XL) 50 MG 24 hr tablet, Take 1 tablet by mouth Every Evening., Disp: 90 tablet, Rfl: 4    montelukast (SINGULAIR) 10 MG tablet, Take 1 tablet by mouth Every Night., Disp: , Rfl:     Multiple Vitamins-Minerals (MULTIVITAMIN PO), Take  by mouth., Disp: , Rfl:     NOVOLOG FLEXPEN 100 UNIT/ML solution pen-injector sc pen, , Disp: , Rfl:     omeprazole (priLOSEC) 20 MG capsule, Take 1 capsule by mouth Daily., Disp: , Rfl:     ondansetron (ZOFRAN) 8 MG tablet, Take 1 tablet by mouth Every 8 (Eight) Hours As  "Needed for Nausea or Vomiting., Disp: , Rfl:     pramipexole (MIRAPEX) 0.125 MG tablet, TAKE 1 T PO EVERY DAY AND TAKE 2 TS PO EVERY NIGHT, Disp: , Rfl:     pravastatin (PRAVACHOL) 20 MG tablet, TAKE ONE TABLET BY MOUTH DAILY, Disp: 30 tablet, Rfl: 11    Probiotic Product (Align) capsule, Take 2 capsules by mouth 2 (two) times a day., Disp: , Rfl:     Semaglutide, 1 MG/DOSE, (OZEMPIC) 2 MG/1.5ML solution pen-injector, 1 (One) Time Per Week., Disp: , Rfl:     traZODone (DESYREL) 150 MG tablet, Take 1 tablet by mouth Daily., Disp: , Rfl:       Objective:     Vitals:    11/15/23 1215   BP: 110/68   Pulse: 69   Weight: 101 kg (223 lb)   Height: 177.8 cm (70\")     Body mass index is 32 kg/m².    PHYSICAL EXAM:    Constitutional:       Appearance: Well-developed.   Eyes:      General: No scleral icterus.     Conjunctiva/sclera: Conjunctivae normal.   HENT:      Head: Normocephalic and atraumatic.   Neck:      Thyroid: No thyromegaly.      Vascular: Normal carotid pulses. No carotid bruit, hepatojugular reflux or JVD.      Trachea: No tracheal deviation.   Pulmonary:      Effort: Pulmonary effort is normal. No respiratory distress.      Breath sounds: Normal breath sounds. No decreased breath sounds. No wheezing. No rhonchi. No rales.   Chest:      Chest wall: Not tender to palpatation.   Cardiovascular:      Normal rate. Regular rhythm.      Murmurs: There is no murmur.      No gallop.  No click. No rub.   Pulses:     Intact distal pulses.      Carotid: 2+ bilaterally.     Radial: 2+ bilaterally.     Femoral: 2+ bilaterally.     Dorsalis pedis: 2+ bilaterally.     Posterior tibial: 2+ bilaterally.  Abdominal:      General: Bowel sounds are normal. There is no distension.      Palpations: Abdomen is soft.      Tenderness: There is no abdominal tenderness.   Musculoskeletal:         General: No deformity.      Cervical back: Normal range of motion and neck supple. Skin:     Findings: No erythema or rash.   Neurological:     "  Mental Status: Alert and oriented to person, place, and time.      Sensory: No sensory deficit.   Psychiatric:         Behavior: Behavior normal.           ECG 12 Lead    Date/Time: 11/15/2023 1:00 PM  Performed by: Alek Do MD    Authorized by: Alek Do MD  Comparison: compared with previous ECG from 11/11/2022  Similar to previous ECG  Rhythm: sinus rhythm  Rate: normal  QRS axis: normal    Clinical impression: normal ECG            Assessment:      Plan:       1.  Elevated calcium score.  He denies any anginal symptoms.    -Continue aspirin and pravastatin therapy.  No changes in dosage.  - Continue metoprolol succinate.  He takes 100 mg in the morning 50 mg in the evening.  If he has additional episodes of lightheadedness we will decrease that dose    2.  PVCs.  Stable on metoprolol.

## 2023-11-28 RX ORDER — METOPROLOL SUCCINATE 50 MG/1
50 TABLET, EXTENDED RELEASE ORAL EVERY EVENING
Qty: 90 TABLET | Refills: 4 | Status: SHIPPED | OUTPATIENT
Start: 2023-11-28

## 2024-01-09 RX ORDER — METOPROLOL SUCCINATE 100 MG/1
100 TABLET, EXTENDED RELEASE ORAL EVERY MORNING
Qty: 90 TABLET | Refills: 4 | Status: SHIPPED | OUTPATIENT
Start: 2024-01-09

## 2024-01-30 RX ORDER — FENOFIBRATE 160 MG/1
TABLET ORAL
Qty: 90 TABLET | Refills: 4 | Status: SHIPPED | OUTPATIENT
Start: 2024-01-30

## 2024-03-06 RX ORDER — PRAVASTATIN SODIUM 20 MG
TABLET ORAL
Qty: 30 TABLET | Refills: 11 | Status: SHIPPED | OUTPATIENT
Start: 2024-03-06

## 2024-07-25 ENCOUNTER — OFFICE (AMBULATORY)
Dept: URBAN - METROPOLITAN AREA CLINIC 64 | Facility: CLINIC | Age: 59
End: 2024-07-25
Payer: COMMERCIAL

## 2024-07-25 VITALS
DIASTOLIC BLOOD PRESSURE: 66 MMHG | WEIGHT: 228 LBS | HEIGHT: 70 IN | SYSTOLIC BLOOD PRESSURE: 96 MMHG | HEART RATE: 68 BPM

## 2024-07-25 DIAGNOSIS — Z85.038 PERSONAL HISTORY OF OTHER MALIGNANT NEOPLASM OF LARGE INTEST: ICD-10-CM

## 2024-07-25 DIAGNOSIS — C18.9 MALIGNANT NEOPLASM OF COLON, UNSPECIFIED: ICD-10-CM

## 2024-07-25 DIAGNOSIS — K21.9 GASTRO-ESOPHAGEAL REFLUX DISEASE WITHOUT ESOPHAGITIS: ICD-10-CM

## 2024-07-25 PROCEDURE — 99214 OFFICE O/P EST MOD 30 MIN: CPT | Performed by: NURSE PRACTITIONER

## 2024-07-25 RX ORDER — OMEPRAZOLE 20 MG/1
CAPSULE, DELAYED RELEASE ORAL
Qty: 90 | Refills: 4 | Status: ACTIVE

## 2024-07-25 RX ORDER — DIPHENOXYLATE HYDROCHLORIDE AND ATROPINE SULFATE 2.5; .025 MG/1; MG/1
7.5 TABLET ORAL
Qty: 30 | Refills: 2 | Status: ACTIVE
Start: 2020-12-10

## 2024-07-25 RX ORDER — DICYCLOMINE HYDROCHLORIDE 20 MG/1
TABLET ORAL
Qty: 90 | Refills: 3 | Status: ACTIVE

## 2024-11-18 ENCOUNTER — OFFICE VISIT (OUTPATIENT)
Dept: CARDIOLOGY | Facility: CLINIC | Age: 59
End: 2024-11-18
Payer: COMMERCIAL

## 2024-11-18 VITALS
HEART RATE: 77 BPM | WEIGHT: 226.6 LBS | DIASTOLIC BLOOD PRESSURE: 64 MMHG | HEIGHT: 70 IN | SYSTOLIC BLOOD PRESSURE: 94 MMHG | BODY MASS INDEX: 32.44 KG/M2

## 2024-11-18 DIAGNOSIS — G47.33 OSA ON CPAP: ICD-10-CM

## 2024-11-18 DIAGNOSIS — I49.3 PVC'S (PREMATURE VENTRICULAR CONTRACTIONS): Primary | ICD-10-CM

## 2024-11-18 DIAGNOSIS — R93.1 AGATSTON CORONARY ARTERY CALCIUM SCORE LESS THAN 100: ICD-10-CM

## 2024-11-18 DIAGNOSIS — I49.1 PREMATURE ATRIAL COMPLEXES: ICD-10-CM

## 2024-11-18 DIAGNOSIS — E78.1 HYPERTRIGLYCERIDEMIA: ICD-10-CM

## 2024-11-18 PROCEDURE — 93000 ELECTROCARDIOGRAM COMPLETE: CPT | Performed by: NURSE PRACTITIONER

## 2024-11-18 PROCEDURE — 99214 OFFICE O/P EST MOD 30 MIN: CPT | Performed by: NURSE PRACTITIONER

## 2024-11-18 RX ORDER — METOPROLOL SUCCINATE 50 MG/1
50 TABLET, EXTENDED RELEASE ORAL 2 TIMES DAILY
Qty: 180 TABLET | Refills: 3 | Status: SHIPPED | OUTPATIENT
Start: 2024-11-18

## 2024-11-18 RX ORDER — PRAVASTATIN SODIUM 20 MG
20 TABLET ORAL DAILY
Qty: 90 TABLET | Refills: 3 | Status: SHIPPED | OUTPATIENT
Start: 2024-11-18

## 2024-11-18 RX ORDER — INSULIN DEGLUDEC 200 U/ML
INJECTION, SOLUTION SUBCUTANEOUS
COMMUNITY
Start: 2024-08-20

## 2024-11-18 NOTE — PROGRESS NOTES
Date of Office Visit: 2024  Encounter Provider: CHRISTIANO London  Place of Service: [facility] CARDIOLOGY  Patient Name: Ridge Dowd Jr.  :1965    No chief complaint on file.  : PVC    HPI: Ridge Dowd Jr. is a 58 y.o. male who is a patient of  Dr. Do.  He is new to me today and presents for a 1 year office follow-up.  He has a history of palpitations, PVCs and coronary artery calcification.  In , he was diagnosed with colon cancer and underwent open transverse to left colectomy with primary anastomosis.  He also underwent chemotherapy.  In 2020, he underwent coronary calcium score demonstrating total score of 98 with calcium mostly in the LAD.    On his last office visit, it was noted he was doing well.  Palpitations were very infrequent.     Mr. Dowd presents today with no complaints.  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  No complaints of chest pain, lightheadedness or shortness of air.  Very infrequent palpitations.  He is euvolemic on physical exam.  Blood pressure little soft at 94/64 today's office visit.  No lightheadedness.  He was started on chlorthalidone by his urologist for kidney stones.  EKG is stable.  Lipid panel in target range except triglycerides.  He is on statin and fenofibrate.  Patient states he was diagnosed with sleep apnea prior to his colon cancer.  Then he lost 50 pounds and no longer snores.  Feels rested.  Followed back up with pulmonary and turned in his CPAP.    Previous testing and notes have been reviewed by me.   Past Medical History:   Diagnosis Date    Abnormal ECG     P.A.C's    Arrhythmia Unk    Asthma Unk    Cancer     colon    Diabetes mellitus     History of hyperlipidemia     with significant hypertriglyceridemia    History of obesity     History of obstructive sleep apnea     Not on CPAP    Personal history of asthma     Personal history of renal calculi     PVC  (premature ventricular contraction)     history of PVC       Past Surgical History:   Procedure Laterality Date    APPENDECTOMY      CHOLECYSTECTOMY      COLON RESECTION  01/2019    OTHER SURGICAL HISTORY      lithotripsy    PYLOROMYOTOMY      TONSILLECTOMY         Social History     Socioeconomic History    Marital status:    Tobacco Use    Smoking status: Never    Smokeless tobacco: Never   Substance and Sexual Activity    Alcohol use: Yes     Alcohol/week: 1.0 standard drink of alcohol     Types: 1 Cans of beer per week     Comment: Daily caffeine use    Drug use: Never    Sexual activity: Yes     Partners: Female     Birth control/protection: Vasectomy, Surgical     Comment: Vasectomy       Family History   Problem Relation Age of Onset    Coronary artery disease Father         s/p 2 separate MI's.  Also has cardiomyopathy and has undergone ICD placement.    Diabetes Father     Arrhythmia Father     Asthma Father     Heart attack Father     No Known Problems Mother        Review of Systems   Constitutional: Negative.   HENT: Negative.     Eyes: Negative.    Cardiovascular: Negative.    Respiratory: Negative.     Endocrine: Negative.    Hematologic/Lymphatic: Negative.    Skin: Negative.    Musculoskeletal: Negative.    Gastrointestinal: Negative.    Genitourinary: Negative.    Neurological:  Positive for numbness.        Pk toes 2/2 prev chemo   Psychiatric/Behavioral: Negative.     Allergic/Immunologic: Negative.        Allergies   Allergen Reactions    Hydrocodone-Acetaminophen Itching    Oxycodone Itching    Oxycodone-Acetaminophen Itching    Chlorhexidine Itching     Itching when chg used to clean skin prior to port access and when chg impregnated dressings used over port site.         Current Outpatient Medications:     ASPIRIN 81 PO, Take 1 tablet by mouth Daily., Disp: , Rfl:     benzonatate (TESSALON) 200 MG capsule, 1 capsule p.o. every 8 hours as needed cough, Disp: 21 capsule, Rfl: 0     buPROPion SR (WELLBUTRIN SR) 100 MG 12 hr tablet, Take 1 tablet by mouth Daily., Disp: , Rfl:     chlorthalidone (HYGROTON) 25 MG tablet, Take  by mouth Daily., Disp: , Rfl:     dapagliflozin Propanediol 10 MG tablet, Take 10 mg by mouth Daily., Disp: , Rfl:     dicyclomine (BENTYL) 20 MG tablet, , Disp: , Rfl:     escitalopram (LEXAPRO) 20 MG tablet, Take 1 tablet by mouth Daily., Disp: , Rfl:     fenofibrate 160 MG tablet, TAKE ONE TABLET BY MOUTH DAILY, Disp: 90 tablet, Rfl: 4    fexofenadine-pseudoephedrine (ALLEGRA-D 24) 180-240 MG per 24 hr tablet, Take 1 tablet by mouth Daily., Disp: , Rfl:     Insulin Glargine (BASAGLAR KWIKPEN) 100 UNIT/ML injection pen, Inject 50 Units under the skin into the appropriate area as directed Daily., Disp: , Rfl:     meloxicam (MOBIC) 15 MG tablet, Take  by mouth Daily., Disp: , Rfl:     metFORMIN (GLUCOPHAGE) 1000 MG tablet, Take 1 tablet by mouth 2 (Two) Times a Day With Meals., Disp: , Rfl:     metoprolol succinate XL (TOPROL-XL) 100 MG 24 hr tablet, TAKE ONE TABLET BY MOUTH EVERY MORNING, Disp: 90 tablet, Rfl: 4    metoprolol succinate XL (TOPROL-XL) 50 MG 24 hr tablet, TAKE ONE TABLET BY MOUTH EVERY EVENING, Disp: 90 tablet, Rfl: 4    montelukast (SINGULAIR) 10 MG tablet, Take 1 tablet by mouth Every Night., Disp: , Rfl:     Multiple Vitamins-Minerals (MULTIVITAMIN PO), Take  by mouth., Disp: , Rfl:     NOVOLOG FLEXPEN 100 UNIT/ML solution pen-injector sc pen, , Disp: , Rfl:     omeprazole (priLOSEC) 20 MG capsule, Take 1 capsule by mouth Daily., Disp: , Rfl:     ondansetron (ZOFRAN) 8 MG tablet, Take 1 tablet by mouth Every 8 (Eight) Hours As Needed for Nausea or Vomiting., Disp: , Rfl:     pramipexole (MIRAPEX) 0.125 MG tablet, TAKE 1 T PO EVERY DAY AND TAKE 2 TS PO EVERY NIGHT, Disp: , Rfl:     pravastatin (PRAVACHOL) 20 MG tablet, TAKE ONE TABLET BY MOUTH DAILY, Disp: 30 tablet, Rfl: 11    Probiotic Product (Align) capsule, Take 2 capsules by mouth 2 (two) times a  day., Disp: , Rfl:     Semaglutide, 1 MG/DOSE, (OZEMPIC) 2 MG/1.5ML solution pen-injector, 1 (One) Time Per Week., Disp: , Rfl:     traZODone (DESYREL) 150 MG tablet, Take 1 tablet by mouth Daily., Disp: , Rfl:       Objective:     There were no vitals filed for this visit.  There is no height or weight on file to calculate BMI.    TTE 04/27/2018:  Left ventricular systolic function is normal. Calculated EF = 64%. Estimated EF was in agreement with the calculated EF. Normal left ventricular cavity size noted. All left ventricular wall segments contract normally. Left ventricular wall thickness is consistent with mild concentric hypertrophy. Left ventricular diastolic function is normal.     48 Hr Holter 04/30/2018:  Sinus rhythm with frequent premature atrial complexes, accounting for 4% of the overall beats. 221 PACs per hour. No ventricular ectopic beats. No atrial fibrillation.     PHYSICAL EXAM:    Constitutional:       Appearance: Healthy appearance. Not in distress.   Neck:      Vascular: No JVR. JVD normal.   Pulmonary:      Effort: Pulmonary effort is normal.      Breath sounds: Normal breath sounds. No wheezing. No rhonchi. No rales.   Chest:      Chest wall: Not tender to palpatation.   Cardiovascular:      PMI at left midclavicular line. Normal rate. Regular rhythm. Normal S1. Normal S2.       Murmurs: There is no murmur.      No gallop.  No click. No rub.   Pulses:     Intact distal pulses.   Edema:     Peripheral edema absent.   Abdominal:      General: Bowel sounds are normal.      Palpations: Abdomen is soft.      Tenderness: There is no abdominal tenderness.   Musculoskeletal: Normal range of motion.         General: No tenderness. Skin:     General: Skin is warm and dry.   Neurological:      General: No focal deficit present.      Mental Status: Alert and oriented to person, place and time.           ECG 12 Lead    Date/Time: 11/18/2024 12:31 PM  Performed by: Aleyda Hurley APRN Authorized  by: Aleyda Hurley APRN  Comparison: compared with previous ECG from 11/15/2023  Similar to previous ECG  Rhythm: sinus rhythm  BPM: 77  T inversion: V5, V6, II, III and aVF  T flattening: V1  QRS axis: right            Assessment:       Diagnosis Plan   1. PVC's (premature ventricular contractions)        2. Premature atrial complexes        3. Agatston coronary artery calcium score less than 100        4. JENN on CPAP        5. Hypertriglyceridemia          No orders of the defined types were placed in this encounter.         Plan:       Elevated calcium score: On aspirin and pravastatin.  PVCs: On metoprolol.  No issues.  Patient is on metoprolol 100 mg in the a.m. and 50 mg in p.m.  With soft blood pressure, going to decrease morning dose to 50 mg.  He is going to monitor blood pressure let me know how readings look.  Diabetes type 2: On metformin and Ozempic.  HbA1c 6.3.  Hypertriglyceridemia: Recent lipid panel trig 266, HDL 30, LDL 47. On pravastatin and fenofibrate. Followed by endocrinologist.     Mr. Dowd will follow-up with Dr. Do in 1 year.  He will call sooner for any questions or concerns.         Your medication list            Accurate as of November 18, 2024 10:49 AM. If you have any questions, ask your nurse or doctor.                CONTINUE taking these medications        Instructions Last Dose Given Next Dose Due   Align capsule      Take 2 capsules by mouth 2 (two) times a day.       ASPIRIN 81 PO      Take 1 tablet by mouth Daily.       BASAGLAR KWIKPEN 100 UNIT/ML injection pen      Inject 50 Units under the skin into the appropriate area as directed Daily.       benzonatate 200 MG capsule  Commonly known as: TESSALON      1 capsule p.o. every 8 hours as needed cough       buPROPion  MG 12 hr tablet  Commonly known as: WELLBUTRIN SR      Take 1 tablet by mouth Daily.       chlorthalidone 25 MG tablet  Commonly known as: HYGROTON      Take  by mouth Daily.       dapagliflozin  Propanediol 10 MG tablet      Take 10 mg by mouth Daily.       dicyclomine 20 MG tablet  Commonly known as: BENTYL           escitalopram 20 MG tablet  Commonly known as: LEXAPRO      Take 1 tablet by mouth Daily.       fenofibrate 160 MG tablet      TAKE ONE TABLET BY MOUTH DAILY       fexofenadine-pseudoephedrine 180-240 MG per 24 hr tablet  Commonly known as: ALLEGRA-D 24      Take 1 tablet by mouth Daily.       meloxicam 15 MG tablet  Commonly known as: MOBIC      Take  by mouth Daily.       metFORMIN 1000 MG tablet  Commonly known as: GLUCOPHAGE      Take 1 tablet by mouth 2 (Two) Times a Day With Meals.       metoprolol succinate XL 50 MG 24 hr tablet  Commonly known as: TOPROL-XL      TAKE ONE TABLET BY MOUTH EVERY EVENING       metoprolol succinate  MG 24 hr tablet  Commonly known as: TOPROL-XL      TAKE ONE TABLET BY MOUTH EVERY MORNING       montelukast 10 MG tablet  Commonly known as: SINGULAIR      Take 1 tablet by mouth Every Night.       multivitamin tablet tablet  Commonly known as: THERAGRAN      Take  by mouth.       NovoLOG FlexPen 100 UNIT/ML solution pen-injector sc pen  Generic drug: insulin aspart           omeprazole 20 MG capsule  Commonly known as: priLOSEC      Take 1 capsule by mouth Daily.       ondansetron 8 MG tablet  Commonly known as: ZOFRAN      Take 1 tablet by mouth Every 8 (Eight) Hours As Needed for Nausea or Vomiting.       pramipexole 0.125 MG tablet  Commonly known as: MIRAPEX      TAKE 1 T PO EVERY DAY AND TAKE 2 TS PO EVERY NIGHT       pravastatin 20 MG tablet  Commonly known as: PRAVACHOL      TAKE ONE TABLET BY MOUTH DAILY       Semaglutide (1 MG/DOSE) 2 MG/1.5ML solution pen-injector  Commonly known as: OZEMPIC      1 (One) Time Per Week.       traZODone 150 MG tablet  Commonly known as: DESYREL      Take 1 tablet by mouth Daily.                  As always, it has been a pleasure to participate in your patient's care.      Sincerely,       CHRISTIANO Miranda

## 2024-11-21 RX ORDER — METOPROLOL SUCCINATE 50 MG/1
50 TABLET, EXTENDED RELEASE ORAL 2 TIMES DAILY
Qty: 180 TABLET | Refills: 3 | OUTPATIENT
Start: 2024-11-21

## 2025-03-24 ENCOUNTER — HOSPITAL ENCOUNTER (EMERGENCY)
Facility: HOSPITAL | Age: 60
Discharge: HOME | End: 2025-03-24
Attending: EMERGENCY MEDICINE | Admitting: EMERGENCY MEDICINE
Payer: COMMERCIAL

## 2025-03-24 VITALS
OXYGEN SATURATION: 97 % | SYSTOLIC BLOOD PRESSURE: 128 MMHG | RESPIRATION RATE: 17 BRPM | WEIGHT: 228.62 LBS | HEIGHT: 70 IN | DIASTOLIC BLOOD PRESSURE: 68 MMHG | BODY MASS INDEX: 32.73 KG/M2 | HEART RATE: 77 BPM | TEMPERATURE: 98.5 F

## 2025-03-24 DIAGNOSIS — S86.812A STRAIN OF LEFT CALF MUSCLE: Primary | ICD-10-CM

## 2025-03-24 PROCEDURE — 99283 EMERGENCY DEPT VISIT LOW MDM: CPT

## 2025-03-24 RX ORDER — HYDROCODONE BITARTRATE AND ACETAMINOPHEN 7.5; 325 MG/1; MG/1
1 TABLET ORAL EVERY 6 HOURS PRN
Qty: 12 TABLET | Refills: 0 | Status: SHIPPED | OUTPATIENT
Start: 2025-03-24

## 2025-03-24 RX ORDER — HYDROCODONE BITARTRATE AND ACETAMINOPHEN 7.5; 325 MG/1; MG/1
1 TABLET ORAL ONCE
Refills: 0 | Status: COMPLETED | OUTPATIENT
Start: 2025-03-24 | End: 2025-03-24

## 2025-03-24 RX ADMIN — HYDROCODONE BITARTRATE AND ACETAMINOPHEN 1 TABLET: 7.5; 325 TABLET ORAL at 20:00

## 2025-03-24 NOTE — ED PROVIDER NOTES
Subjective   History of Present Illness  Chief complaint: Left leg injury    59-year-old male presents with a left leg injury.  Patient states he was pushing a truck into a garage with a friend this evening.  As he was pushing he felt like something hit the back of his left lower leg.  Since then he has noticed some swelling to the left calf and some difficulty walking.    History provided by:  Patient      Review of Systems   Constitutional:  Negative for fever.   HENT:  Negative for congestion.    Respiratory:  Negative for cough and shortness of breath.    Cardiovascular:  Negative for chest pain.   Gastrointestinal:  Negative for abdominal pain.   Musculoskeletal:         Left lower leg injury   Neurological:  Negative for headaches.       Past Medical History:   Diagnosis Date    Abnormal ECG     P.A.C's    Arrhythmia Unk    Asthma Unk    Cancer     colon    Diabetes mellitus     History of hyperlipidemia     with significant hypertriglyceridemia    History of obesity     History of obstructive sleep apnea     Not on CPAP    Personal history of asthma     Personal history of renal calculi     PVC (premature ventricular contraction)     history of PVC       Allergies   Allergen Reactions    Chlorhexidine Itching     Itching when chg used to clean skin prior to port access and when chg impregnated dressings used over port site.       Past Surgical History:   Procedure Laterality Date    APPENDECTOMY      CHOLECYSTECTOMY      COLON RESECTION  01/2019    OTHER SURGICAL HISTORY      lithotripsy    PYLOROMYOTOMY      TONSILLECTOMY         Family History   Problem Relation Age of Onset    Coronary artery disease Father         s/p 2 separate MI's.  Also has cardiomyopathy and has undergone ICD placement.    Diabetes Father     Arrhythmia Father     Asthma Father     Heart attack Father     No Known Problems Mother        Social History     Socioeconomic History    Marital status:    Tobacco Use    Smoking  "status: Never     Passive exposure: Never    Smokeless tobacco: Never   Vaping Use    Vaping status: Never Used   Substance and Sexual Activity    Alcohol use: Yes     Alcohol/week: 1.0 standard drink of alcohol     Types: 1 Cans of beer per week     Comment: Daily caffeine use    Drug use: Never    Sexual activity: Yes     Partners: Female     Birth control/protection: Vasectomy, Surgical     Comment: Vasectomy       /68   Pulse 77   Temp 98.5 °F (36.9 °C)   Resp 17   Ht 177.8 cm (70\")   Wt 104 kg (228 lb 9.9 oz)   SpO2 97%   BMI 32.80 kg/m²       Objective   Physical Exam  Vitals and nursing note reviewed.   Constitutional:       Appearance: Normal appearance.   HENT:      Head: Normocephalic and atraumatic.      Mouth/Throat:      Mouth: Mucous membranes are moist.   Cardiovascular:      Rate and Rhythm: Normal rate and regular rhythm.   Pulmonary:      Effort: Pulmonary effort is normal. No respiratory distress.   Musculoskeletal:      Comments: There is some swelling to the left calf with tenderness on palpation.  There is no tenderness over the Achilles and Menchaca test is negative.  Good distal pulses and good cap refill.   Skin:     General: Skin is warm and dry.   Neurological:      Mental Status: He is alert and oriented to person, place, and time.         Procedures           ED Course                                                       Medical Decision Making  Problems Addressed:  Strain of left calf muscle: complicated acute illness or injury    Risk  Prescription drug management.      Exam is consistent with a left calf tear.  Achilles appears to be intact.  Patient was placed in a cam boot.  He will be given orthopedic follow-up.  He will be given a small prescription for Norco.      Final diagnoses:   Strain of left calf muscle       ED Disposition  ED Disposition       ED Disposition   Discharge    Condition   Stable    Comment   --               Jaspal Zaldivar II, DO  [address] [place] [address] [phone]    Call in 1 day           Medication List        New Prescriptions      HYDROcodone-acetaminophen 7.5-325 MG per tablet  Commonly known as: NORCO  Take 1 tablet by mouth Every 6 (Six) Hours As Needed for Moderate Pain.               Where to Get Your Medications        These medications were sent to [facility] [id number] - [place], [place] - [address] - [phone]  - [phone] [address] [place] [address]      Phone: [phone]   HYDROcodone-acetaminophen 7.5-325 MG per tablet            Javi Wall MD  03/24/25 1948       Javi Wall MD  03/24/25 1951

## 2025-03-24 NOTE — DISCHARGE INSTRUCTIONS
Follow-up with orthopedics as directed.  Return to the emergency room for any new or worsening symptoms or if you have any other questions or concerns.  Use cam boot until follow-up with orthopedics.

## 2025-03-24 NOTE — Clinical Note
[facility] EMERGENCY DEPARTMENT  [address] [place] [address]  Phone: [phone]    Ridge Dowd was seen and treated in our emergency department on 3/24/2025.  He may return to work on 03/31/2025.         Thank you for choosing [facility].    Javi Wall MD

## 2025-03-28 ENCOUNTER — OFFICE VISIT (OUTPATIENT)
Dept: ORTHOPEDIC SURGERY | Facility: CLINIC | Age: 60
End: 2025-03-28
Payer: COMMERCIAL

## 2025-03-28 VITALS — RESPIRATION RATE: 20 BRPM | BODY MASS INDEX: 32.64 KG/M2 | HEIGHT: 70 IN | OXYGEN SATURATION: 97 % | WEIGHT: 228 LBS

## 2025-03-28 DIAGNOSIS — M79.662 PAIN IN LEFT LOWER LEG: Primary | ICD-10-CM

## 2025-03-28 PROCEDURE — 76882 US LMTD JT/FCL EVL NVASC XTR: CPT | Performed by: FAMILY MEDICINE

## 2025-03-28 PROCEDURE — 99203 OFFICE O/P NEW LOW 30 MIN: CPT | Performed by: FAMILY MEDICINE

## 2025-03-31 NOTE — PROGRESS NOTES
Primary Care Sports Medicine Office Visit Note    Patient ID: Ridge Dowd Jr. is a 59 y.o. male.    Chief Complaint:  Chief Complaint   Patient presents with    Left Lower Leg - Pain, Initial Evaluation     DOI-3/24/2025        History of Present Illness  The patient presents for evaluation of a calf injury.    He sustained an injury to his calf while assisting a friend in moving a large F250 truck into a garage. During the process, he experienced a sudden impact to the back of his leg, which was followed by immediate swelling. Despite the absence of any visible discoloration, he sought medical attention at [facility] where he was diagnosed with a calf strain. The attending physician recommended further evaluation by a sports medicine specialist and provided him with a boot for support. He reports that his entire leg appears swollen compared to the unaffected leg. He is able to flex and push with his foot, but weightbearing is limited due to pain. He also notes a sensation of pulling in his foot when transitioning from sitting to walking. He has been managing the pain with medication prescribed by the ER.       Past Medical History:   Diagnosis Date    Abnormal ECG     P.A.C's    Arrhythmia Unk    Asthma Unk    Cancer     colon    Diabetes mellitus     History of hyperlipidemia     with significant hypertriglyceridemia    History of obesity     History of obstructive sleep apnea     Not on CPAP    Personal history of asthma     Personal history of renal calculi     PVC (premature ventricular contraction)     history of PVC       Past Surgical History:   Procedure Laterality Date    APPENDECTOMY      CHOLECYSTECTOMY      COLON RESECTION  01/2019    OTHER SURGICAL HISTORY      lithotripsy    PYLOROMYOTOMY      TONSILLECTOMY         Family History   Problem Relation Age of Onset    Coronary artery disease Father         s/p 2 separate MI's.  Also has cardiomyopathy and has undergone ICD placement.    Diabetes  "Father     Arrhythmia Father     Asthma Father     Heart attack Father     No Known Problems Mother      Social History     Occupational History    Not on file   Tobacco Use    Smoking status: Never     Passive exposure: Never    Smokeless tobacco: Never   Vaping Use    Vaping status: Never Used   Substance and Sexual Activity    Alcohol use: Yes     Alcohol/week: 1.0 standard drink of alcohol     Types: 1 Cans of beer per week     Comment: Daily caffeine use    Drug use: Never    Sexual activity: Yes     Partners: Female     Birth control/protection: Vasectomy, Surgical     Comment: Vasectomy        Review of Systems:  Review of Systems   Constitutional:  Negative for activity change, fatigue and fever.   Musculoskeletal:  Positive for arthralgias.   Skin:  Negative for color change and rash.   Neurological:  Negative for numbness.     Objective:  Physical Exam  Resp 20   Ht 177.8 cm (70\")   Wt 103 kg (228 lb)   SpO2 97%   BMI 32.71 kg/m²   Vitals and nursing note reviewed.   Constitutional:       General: he  is not in acute distress.     Appearance: he is well-developed. he is not diaphoretic.   HENT:      Head: Normocephalic and atraumatic.   Eyes:      Conjunctiva/sclera: Conjunctivae normal.   Pulmonary:      Effort: Pulmonary effort is normal. No respiratory distress.   Skin:     General: Skin is warm.      Capillary Refill: Capillary refill takes less than 2 seconds.   Neurological:      Mental Status: he is alert.     Ortho Exam:  Physical Exam  There is considerable tenderness palpation of the mid muscle belly of the medial gastroc musculature of the left lower extremity posteriorly.  There is palpable fluid collection in this area intramuscularly as well.  Plantarflexion is 5/5 to resistance, but weightbearing is difficult, and weightbearing plantarflexion of both ankles is not tolerable.    Results  Imaging Limited diagnostic musculoskeletal ultrasound:  Indication: Calf injury, PRP eval " "  Comparative data: None   Findings: Two-dimensional grayscale ultrasound was used to evaluate the entirety of the left lower leg posteriorly.  Origins of the medial and lateral heads of the gastroc were evaluated at the level of the knee.  Origin tendons exhibit no pathology or peritendinous edema.  Distally the Achilles tendon was evaluated at its insertion distally, although up to the myotendinous junction proximally.  There is no obvious edema, defect, or mass within the Achilles tendon.  This showed no pathology as well.  In the mid muscle belly substance of the medial gastroc there is considerable intramuscular hematoma with heterogenous appearance and near 60% defect of the cross-sectional diameter of the medial gastroc musculature.  No soleus involvement.    Diagnoses and all orders for this visit:    1. Pain in left lower leg (Primary)  -     Miscellaneous DME      Assessment & Plan  1. Calf injury.  The patient reports a calf injury sustained while pushing a truck, resulting in significant swelling and pain. The physical examination suggests a possible tear in the medial head of the gastrocnemius muscle, with a large intramuscular hematoma present. The Achilles tendon is normal. An MRI was discussed as a potential diagnostic tool to evaluate the extent of the tear. The option of Platelet-Rich Plasma (PRP) therapy was also presented, including its benefits, risks, and costs. He was advised to conduct further research on PRP therapy before making a decision. A heel lift will be provided to aid in the healing process by shortening the muscle fibers and facilitating the compression of the tear. He is instructed to avoid strenuous activities such as pushing or pressing, and to limit his movements to walking on flat ground and standing on his heel for the next 4 weeks.    Jaspal PACHECO \"Chance\" Zen STEVENS DO, CAQSM  03/31/25  09:39 EDT    Disclaimer: Please note that areas of this note were completed with computer " voice recognition software.  Quite often unanticipated grammatical, syntax, homophones, and other interpretive errors are inadvertently transcribed by the computer software. Please excuse any errors that have escaped final proofreading.    Patient or patient representative verbalized consent for the use of Ambient Listening during the visit with  Jaspal Zaldivar II, DO for chart documentation. 09:39 EDT 03/31/25

## 2025-04-23 RX ORDER — FENOFIBRATE 160 MG/1
160 TABLET ORAL DAILY
Qty: 90 TABLET | Refills: 4 | Status: SHIPPED | OUTPATIENT
Start: 2025-04-23

## 2025-04-24 ENCOUNTER — OFFICE VISIT (OUTPATIENT)
Dept: ORTHOPEDIC SURGERY | Facility: CLINIC | Age: 60
End: 2025-04-24
Payer: COMMERCIAL

## 2025-04-24 VITALS — HEART RATE: 77 BPM | HEIGHT: 70 IN | WEIGHT: 228 LBS | BODY MASS INDEX: 32.64 KG/M2 | OXYGEN SATURATION: 98 %

## 2025-04-24 DIAGNOSIS — S86.112D RUPTURE OF LEFT GASTROCNEMIUS TENDON, SUBSEQUENT ENCOUNTER: Primary | ICD-10-CM

## 2025-04-24 RX ORDER — ACYCLOVIR 400 MG/1
TABLET ORAL
COMMUNITY
Start: 2025-04-22

## 2025-04-24 RX ORDER — METHOCARBAMOL 500 MG/1
500 TABLET, FILM COATED ORAL 4 TIMES DAILY
COMMUNITY

## 2025-04-24 NOTE — PROGRESS NOTES
Primary Care Sports Medicine Office Visit Note    Patient ID: Ridge Dowd Jr. is a 59 y.o. male.    Chief Complaint:  Chief Complaint   Patient presents with    Left Lower Leg - Follow-up, Pain     DOI-3/24/2025   Pain 0/10       History of Present Illness  The patient presents for evaluation of calf pain.    Gradual improvement in the calf condition is reported, with the boot removed approximately one week ago. The affected leg has been utilized to some extent since then, allowing performance of tasks such as tiptoeing and ascending stairs, although a slight limp is occasionally exhibited. Residual bruising and mild swelling are noted, particularly noticeable upon removing socks at night. Soreness is experienced in the shin area. Physical therapy has been undergone twice, with a desire to continue this regimen.    He had ACDF surgery on 04/01/2025, and after 6 weeks, physical therapy is recommended.    PAST SURGICAL HISTORY:  ACDF surgery on 04/01/2025.       Past Medical History:   Diagnosis Date    Abnormal ECG     P.A.C's    Ankle sprain     Arrhythmia Unk    Asthma Unk    Cancer     colon    Cervical disc disorder     Had ACDF Surgery 4-1-2025    Diabetes mellitus     Fracture, finger     History of hyperlipidemia     with significant hypertriglyceridemia    History of obesity     History of obstructive sleep apnea     Not on CPAP    Knee swelling     Low back strain     Neck strain     Personal history of asthma     Personal history of renal calculi     PVC (premature ventricular contraction)     history of PVC    Tennis elbow        Past Surgical History:   Procedure Laterality Date    APPENDECTOMY      CHOLECYSTECTOMY      COLON RESECTION  01/2019    HAND SURGERY      NECK SURGERY  4-1-25    OTHER SURGICAL HISTORY      lithotripsy    PYLOROMYOTOMY      TONSILLECTOMY         Family History   Problem Relation Age of Onset    Coronary artery disease Father         s/p 2 separate MI's.  Also has cardiomyopathy  "and has undergone ICD placement.    Diabetes Father     Arrhythmia Father     Asthma Father     Heart attack Father     Cancer Mother      Social History     Occupational History    Not on file   Tobacco Use    Smoking status: Every Day     Passive exposure: Never    Smokeless tobacco: Never   Vaping Use    Vaping status: Never Used   Substance and Sexual Activity    Alcohol use: Yes     Alcohol/week: 1.0 standard drink of alcohol     Types: 1 Cans of beer per week     Comment: Daily caffeine use    Drug use: Never    Sexual activity: Yes     Partners: Female     Birth control/protection: Vasectomy, Surgical     Comment: Vasectomy        Review of Systems:  Review of Systems   Constitutional:  Negative for activity change, fatigue and fever.   Musculoskeletal:  Positive for arthralgias.   Skin:  Negative for color change and rash.   Neurological:  Negative for numbness.     Objective:  Physical Exam  Pulse 77   Ht 177.8 cm (70\")   Wt 103 kg (228 lb)   SpO2 98%   BMI 32.71 kg/m²   Vitals and nursing note reviewed.   Constitutional:       General: he  is not in acute distress.     Appearance: he is well-developed. he is not diaphoretic.   HENT:      Head: Normocephalic and atraumatic.   Eyes:      Conjunctiva/sclera: Conjunctivae normal.   Pulmonary:      Effort: Pulmonary effort is normal. No respiratory distress.   Skin:     General: Skin is warm.      Capillary Refill: Capillary refill takes less than 2 seconds.   Neurological:      Mental Status: he is alert.     Ortho Exam:  Physical Exam  There is moderate tenderness palpation to the myotendinous junction and proximal to the medial muscle belly of the gastrocnemius of the left lower extremity.  Otherwise single-leg and double leg weightbearing plantarflexion is 5/5 in strength.    Results  No new imaging today.    Assessment & Plan  1.  Partial medial gastrocnemius tear  - Reports improvement in calf pain and has been using the leg since removing the boot " "approximately one week ago.  - Physical examination reveals residual swelling, bruising, and soreness, particularly in the shin area, with noted weakness in the tibialis anterior muscle.  - Discussed the need for physical therapy to enhance strength and mobility; patient will inform his current physical therapist to include leg exercises.  - If a specific order is required, patient will send a message via "[employer] and an order will be placed accordingly.    Jaspal JUNG. \"Chance\" Zen STEVENS DO, CAQSM  04/24/25  17:15 EDT    Disclaimer: Please note that areas of this note were completed with computer voice recognition software.  Quite often unanticipated grammatical, syntax, homophones, and other interpretive errors are inadvertently transcribed by the computer software. Please excuse any errors that have escaped final proofreading.    Patient or patient representative verbalized consent for the use of Ambient Listening during the visit with  Jaspal Zaldivar II, DO for chart documentation. 17:15 EDT 04/24/25   "

## 2025-07-16 ENCOUNTER — OFFICE (AMBULATORY)
Dept: URBAN - METROPOLITAN AREA CLINIC 64 | Facility: CLINIC | Age: 60
End: 2025-07-16
Payer: COMMERCIAL

## 2025-07-16 VITALS
HEART RATE: 73 BPM | DIASTOLIC BLOOD PRESSURE: 64 MMHG | SYSTOLIC BLOOD PRESSURE: 104 MMHG | WEIGHT: 225 LBS | HEIGHT: 70 IN

## 2025-07-16 DIAGNOSIS — R19.7 DIARRHEA, UNSPECIFIED: ICD-10-CM

## 2025-07-16 PROCEDURE — 99213 OFFICE O/P EST LOW 20 MIN: CPT | Performed by: NURSE PRACTITIONER
